# Patient Record
Sex: FEMALE | Race: BLACK OR AFRICAN AMERICAN | Employment: FULL TIME | URBAN - METROPOLITAN AREA
[De-identification: names, ages, dates, MRNs, and addresses within clinical notes are randomized per-mention and may not be internally consistent; named-entity substitution may affect disease eponyms.]

---

## 2019-02-27 ENCOUNTER — HOSPITAL ENCOUNTER (EMERGENCY)
Facility: HOSPITAL | Age: 19
Discharge: HOME | End: 2019-02-27
Attending: EMERGENCY MEDICINE
Payer: MEDICAID

## 2019-02-27 VITALS
WEIGHT: 125 LBS | TEMPERATURE: 99 F | HEART RATE: 90 BPM | OXYGEN SATURATION: 97 % | BODY MASS INDEX: 23 KG/M2 | RESPIRATION RATE: 18 BRPM | HEIGHT: 62 IN | SYSTOLIC BLOOD PRESSURE: 142 MMHG | DIASTOLIC BLOOD PRESSURE: 66 MMHG

## 2019-02-27 DIAGNOSIS — O20.0 THREATENED ABORTION: ICD-10-CM

## 2019-02-27 DIAGNOSIS — O20.9 VAGINAL BLEEDING AFFECTING EARLY PREGNANCY: Primary | ICD-10-CM

## 2019-02-27 LAB — RH BLOOD TYPE: POSITIVE

## 2019-02-27 PROCEDURE — 86901 BLOOD TYPING SEROLOGIC RH(D): CPT | Performed by: EMERGENCY MEDICINE

## 2019-02-27 PROCEDURE — 99284 EMERGENCY DEPT VISIT MOD MDM: CPT

## 2019-02-27 PROCEDURE — 76705 ECHO EXAM OF ABDOMEN: CPT

## 2019-02-27 PROCEDURE — 86900 BLOOD TYPING SEROLOGIC ABO: CPT | Performed by: EMERGENCY MEDICINE

## 2019-02-28 NOTE — ED PROVIDER NOTES
Patient Seen in: [facility] AND [facility] Emergency Department    History   Patient presents with:  Pregnancy Issues (gynecologic)      HPI    Patient presents to the ED complaining of mild vaginal bleeding starting this afternoon.   Bleeding has waxed and waned distension. There is no tenderness. Musculoskeletal: She exhibits no edema or deformity. Neurological: She is alert and oriented to person, place, and time. Skin: Skin is warm and dry. Psychiatric: She has a normal mood and affect.  Her behavior is department: Stable  .     Disposition and Plan     Clinical Impression:  Vaginal bleeding affecting early pregnancy  (primary encounter diagnosis)  Threatened     Disposition:  Discharge    Follow-up:  Your OB/GYN    Schedule an appointment as soon

## 2019-04-05 ENCOUNTER — OFFICE VISIT (OUTPATIENT)
Dept: INTERNAL MEDICINE CLINIC | Facility: CLINIC | Age: 19
End: 2019-04-05
Payer: MEDICAID

## 2019-04-05 VITALS
HEIGHT: 62 IN | TEMPERATURE: 98 F | BODY MASS INDEX: 22.82 KG/M2 | SYSTOLIC BLOOD PRESSURE: 107 MMHG | HEART RATE: 81 BPM | DIASTOLIC BLOOD PRESSURE: 66 MMHG | WEIGHT: 124 LBS

## 2019-04-05 DIAGNOSIS — Z3A.16 16 WEEKS GESTATION OF PREGNANCY: ICD-10-CM

## 2019-04-05 DIAGNOSIS — Z00.00 PHYSICAL EXAM: Primary | ICD-10-CM

## 2019-04-05 PROCEDURE — 99385 PREV VISIT NEW AGE 18-39: CPT | Performed by: INTERNAL MEDICINE

## 2019-04-06 NOTE — PROGRESS NOTES
HPI:    Patient ID: Lynette Jo is a 20year old female.     HPI    Here for physical exam  New paitent  Switching OB  Need referral for [facility] 16 mo pregnant  Was nauseated  Feeling better    /66 (BP Location: Right arm, Patient Position: Si Smokeless tobacco: Never Used    Alcohol use: No      Frequency: Never    Drug use: No       PHYSICAL EXAM:    Physical Exam   Constitutional: She appears well-nourished. No distress. HENT:   Head: Normocephalic and atraumatic.    Right Ear: External ear

## 2019-04-07 ENCOUNTER — HOSPITAL ENCOUNTER (EMERGENCY)
Facility: HOSPITAL | Age: 19
Discharge: HOME | End: 2019-04-07
Attending: EMERGENCY MEDICINE
Payer: MEDICAID

## 2019-04-07 ENCOUNTER — APPOINTMENT (OUTPATIENT)
Dept: ULTRASOUND IMAGING | Facility: HOSPITAL | Age: 19
End: 2019-04-07
Attending: EMERGENCY MEDICINE
Payer: MEDICAID

## 2019-04-07 VITALS
TEMPERATURE: 98 F | BODY MASS INDEX: 23 KG/M2 | SYSTOLIC BLOOD PRESSURE: 123 MMHG | DIASTOLIC BLOOD PRESSURE: 62 MMHG | OXYGEN SATURATION: 97 % | HEIGHT: 62 IN | RESPIRATION RATE: 18 BRPM | WEIGHT: 125 LBS | HEART RATE: 80 BPM

## 2019-04-07 DIAGNOSIS — O46.92 VAGINAL BLEEDING IN PREGNANCY, SECOND TRIMESTER: Primary | ICD-10-CM

## 2019-04-07 PROCEDURE — 84702 CHORIONIC GONADOTROPIN TEST: CPT | Performed by: EMERGENCY MEDICINE

## 2019-04-07 PROCEDURE — 80048 BASIC METABOLIC PNL TOTAL CA: CPT | Performed by: EMERGENCY MEDICINE

## 2019-04-07 PROCEDURE — 85025 COMPLETE CBC W/AUTO DIFF WBC: CPT | Performed by: EMERGENCY MEDICINE

## 2019-04-07 PROCEDURE — 93975 VASCULAR STUDY: CPT | Performed by: EMERGENCY MEDICINE

## 2019-04-07 PROCEDURE — 76815 OB US LIMITED FETUS(S): CPT | Performed by: EMERGENCY MEDICINE

## 2019-04-07 PROCEDURE — 99284 EMERGENCY DEPT VISIT MOD MDM: CPT

## 2019-04-07 PROCEDURE — 86901 BLOOD TYPING SEROLOGIC RH(D): CPT | Performed by: EMERGENCY MEDICINE

## 2019-04-07 PROCEDURE — 86900 BLOOD TYPING SEROLOGIC ABO: CPT | Performed by: EMERGENCY MEDICINE

## 2019-04-07 PROCEDURE — 81025 URINE PREGNANCY TEST: CPT

## 2019-04-07 PROCEDURE — 96374 THER/PROPH/DIAG INJ IV PUSH: CPT

## 2019-04-07 PROCEDURE — 81001 URINALYSIS AUTO W/SCOPE: CPT | Performed by: EMERGENCY MEDICINE

## 2019-04-07 RX ORDER — ONDANSETRON 2 MG/ML
4 INJECTION INTRAMUSCULAR; INTRAVENOUS ONCE
Status: COMPLETED | OUTPATIENT
Start: 2019-04-07 | End: 2019-04-07

## 2019-04-07 NOTE — ED INITIAL ASSESSMENT (HPI)
Pt is G1 16 weeks pregnant who experience spotting/slight vaginal bleeding x 2 days + cramping to RLQ

## 2019-04-07 NOTE — ED PROVIDER NOTES
Patient Seen in: [facility] AND [facility] Emergency Department    History   Patient presents with:  Pregnancy Issues (gynecologic)    Stated Complaint: Bleeding    HPI    21 yo  who is 16 weeks pregnant presents with lower abdomen crampy abdominal pain and Neurological: She is alert and oriented to person, place, and time. No cranial nerve deficit. Skin: Skin is warm and dry. Psychiatric: She has a normal mood and affect. Her behavior is normal.   Nursing note and vitals reviewed.            ED Course needed        Medications Prescribed:  Current Discharge Medication List

## 2019-04-12 ENCOUNTER — TELEPHONE (OUTPATIENT)
Dept: OBGYN CLINIC | Facility: CLINIC | Age: 19
End: 2019-04-12

## 2019-04-12 ENCOUNTER — OFFICE VISIT (OUTPATIENT)
Dept: OBGYN CLINIC | Facility: CLINIC | Age: 19
End: 2019-04-12

## 2019-04-12 VITALS
BODY MASS INDEX: 23.55 KG/M2 | DIASTOLIC BLOOD PRESSURE: 79 MMHG | WEIGHT: 128 LBS | HEIGHT: 62 IN | SYSTOLIC BLOOD PRESSURE: 134 MMHG | HEART RATE: 102 BPM

## 2019-04-12 DIAGNOSIS — O46.90 VAGINAL BLEEDING IN PREGNANCY: Primary | ICD-10-CM

## 2019-04-12 PROCEDURE — 99212 OFFICE O/P EST SF 10 MIN: CPT | Performed by: OBSTETRICS & GYNECOLOGY

## 2019-04-12 NOTE — TELEPHONE ENCOUNTER
PT NOTIFIED WE DID RECEIVE HER RECORDS AND CAN SCHEDULE AN OBN APPT SINCE SHE IS 17 WEEKS. APPT MADE. RECORDS ABOVE DESK IN FRONT OFFICE.

## 2019-04-12 NOTE — PROGRESS NOTES
James TOBIN 3/6/2000       Patient presents with: Follow - Up: ER F/U NEW PATIENT. Bandar Abbott VAGINAL BLEEDING   pt had prenatal care at [facility] and was seeing the midwives. She started care in January and has had 4 visits.   She does not have her pren

## 2019-04-17 ENCOUNTER — NURSE ONLY (OUTPATIENT)
Dept: OBGYN CLINIC | Facility: CLINIC | Age: 19
End: 2019-04-17
Payer: MEDICAID

## 2019-04-17 VITALS — BODY MASS INDEX: 23 KG/M2 | WEIGHT: 123.25 LBS

## 2019-04-17 DIAGNOSIS — Z34.90 PREGNANCY, UNSPECIFIED GESTATIONAL AGE: Primary | ICD-10-CM

## 2019-04-17 PROCEDURE — 99211 OFF/OP EST MAY X REQ PHY/QHP: CPT | Performed by: OBSTETRICS & GYNECOLOGY

## 2019-04-17 NOTE — PROGRESS NOTES
Pt seen for OBN appt today with no complaints. Transfer of care from [facility]. States was having vomiting every other day that has improved in the last two weeks. States has been able to tolerate fluids. Denies symptoms now.   Hep C la No    Hemophilia No    [place]'s Chorea No    Mental Retardation/Autism No    Muscular Dystrophy No    Neural tube defects No    Sickle Cell Disease or trait No    Kevin-Sachs Disease No    Thalassemia No    Other inherited genetic or chromosomal disorder

## 2019-04-18 ENCOUNTER — TELEPHONE (OUTPATIENT)
Dept: OBGYN CLINIC | Facility: CLINIC | Age: 19
End: 2019-04-18

## 2019-04-18 NOTE — TELEPHONE ENCOUNTER
Pt was wondering if she can come tomorrow to have the lab done. Pt informed that she does not need an appt and can come whenever is convenient for her. Pt wondering which hepatitis test it is for.   Pt informed it is Hep C and her order is in the computer

## 2019-04-19 ENCOUNTER — APPOINTMENT (OUTPATIENT)
Dept: LAB | Facility: HOSPITAL | Age: 19
End: 2019-04-19
Attending: OBSTETRICS & GYNECOLOGY
Payer: MEDICAID

## 2019-04-19 DIAGNOSIS — Z34.90 PREGNANCY, UNSPECIFIED GESTATIONAL AGE: ICD-10-CM

## 2019-04-19 PROCEDURE — 36415 COLL VENOUS BLD VENIPUNCTURE: CPT

## 2019-04-19 PROCEDURE — 86803 HEPATITIS C AB TEST: CPT

## 2019-04-22 ENCOUNTER — INITIAL PRENATAL (OUTPATIENT)
Dept: OBGYN CLINIC | Facility: CLINIC | Age: 19
End: 2019-04-22
Payer: MEDICAID

## 2019-04-22 VITALS
WEIGHT: 125 LBS | BODY MASS INDEX: 23 KG/M2 | SYSTOLIC BLOOD PRESSURE: 133 MMHG | DIASTOLIC BLOOD PRESSURE: 81 MMHG | HEART RATE: 117 BPM

## 2019-04-22 DIAGNOSIS — Z34.91 ENCOUNTER FOR SUPERVISION OF NORMAL PREGNANCY IN FIRST TRIMESTER, UNSPECIFIED GRAVIDITY: Primary | ICD-10-CM

## 2019-04-22 PROBLEM — Z33.1 PREGNANT STATE, INCIDENTAL: Status: ACTIVE | Noted: 2019-04-22

## 2019-04-22 PROBLEM — Z33.1 PREGNANT STATE, INCIDENTAL (HCC): Status: ACTIVE | Noted: 2019-04-22

## 2019-04-22 PROCEDURE — 0500F INITIAL PRENATAL CARE VISIT: CPT | Performed by: OBSTETRICS & GYNECOLOGY

## 2019-04-29 ENCOUNTER — TELEPHONE (OUTPATIENT)
Dept: OBGYN CLINIC | Facility: CLINIC | Age: 19
End: 2019-04-29

## 2019-05-03 ENCOUNTER — HOSPITAL ENCOUNTER (OUTPATIENT)
Dept: ULTRASOUND IMAGING | Facility: HOSPITAL | Age: 19
Discharge: HOME | End: 2019-05-03
Attending: OBSTETRICS & GYNECOLOGY
Payer: MEDICAID

## 2019-05-03 DIAGNOSIS — Z34.91 ENCOUNTER FOR SUPERVISION OF NORMAL PREGNANCY IN FIRST TRIMESTER, UNSPECIFIED GRAVIDITY: ICD-10-CM

## 2019-05-03 PROCEDURE — 76805 OB US >/= 14 WKS SNGL FETUS: CPT | Performed by: OBSTETRICS & GYNECOLOGY

## 2019-05-22 ENCOUNTER — ROUTINE PRENATAL (OUTPATIENT)
Dept: OBGYN CLINIC | Facility: CLINIC | Age: 19
End: 2019-05-22

## 2019-05-22 VITALS
DIASTOLIC BLOOD PRESSURE: 70 MMHG | WEIGHT: 129 LBS | HEART RATE: 87 BPM | BODY MASS INDEX: 24 KG/M2 | SYSTOLIC BLOOD PRESSURE: 119 MMHG

## 2019-05-22 DIAGNOSIS — Z34.02 ENCOUNTER FOR SUPERVISION OF NORMAL FIRST PREGNANCY IN SECOND TRIMESTER: Primary | ICD-10-CM

## 2019-05-22 PROCEDURE — 99212 OFFICE O/P EST SF 10 MIN: CPT | Performed by: OBSTETRICS & GYNECOLOGY

## 2019-05-22 PROCEDURE — 81002 URINALYSIS NONAUTO W/O SCOPE: CPT | Performed by: OBSTETRICS & GYNECOLOGY

## 2019-06-26 ENCOUNTER — APPOINTMENT (OUTPATIENT)
Dept: LAB | Facility: HOSPITAL | Age: 19
End: 2019-06-26
Attending: OBSTETRICS & GYNECOLOGY
Payer: MEDICAID

## 2019-06-26 DIAGNOSIS — Z34.02 ENCOUNTER FOR SUPERVISION OF NORMAL FIRST PREGNANCY IN SECOND TRIMESTER: ICD-10-CM

## 2019-06-26 PROCEDURE — 82950 GLUCOSE TEST: CPT

## 2019-06-26 PROCEDURE — 36415 COLL VENOUS BLD VENIPUNCTURE: CPT

## 2019-06-26 PROCEDURE — 85027 COMPLETE CBC AUTOMATED: CPT

## 2019-07-03 ENCOUNTER — ROUTINE PRENATAL (OUTPATIENT)
Dept: OBGYN CLINIC | Facility: CLINIC | Age: 19
End: 2019-07-03
Payer: MEDICAID

## 2019-07-03 VITALS
DIASTOLIC BLOOD PRESSURE: 86 MMHG | WEIGHT: 132.81 LBS | SYSTOLIC BLOOD PRESSURE: 130 MMHG | BODY MASS INDEX: 24 KG/M2 | HEART RATE: 82 BPM

## 2019-07-03 DIAGNOSIS — Z34.93 ENCOUNTER FOR SUPERVISION OF NORMAL PREGNANCY IN THIRD TRIMESTER, UNSPECIFIED GRAVIDITY: Primary | ICD-10-CM

## 2019-07-03 LAB
MULTISTIX LOT#: NORMAL NUMERIC
PH, URINE: 6.5 (ref 4.5–8)
SPECIFIC GRAVITY: 1.01 (ref 1–1.03)
UROBILINOGEN,SEMI-QN: 0 MG/DL (ref 0–1.9)

## 2019-07-03 PROCEDURE — 0502F SUBSEQUENT PRENATAL CARE: CPT | Performed by: OBSTETRICS & GYNECOLOGY

## 2019-07-03 PROCEDURE — 81002 URINALYSIS NONAUTO W/O SCOPE: CPT | Performed by: OBSTETRICS & GYNECOLOGY

## 2019-07-03 NOTE — PROGRESS NOTES
Had IC last night and had an episode of PCB. Light pink spotting. NO cramping, lof. +FM. No bleeding since. Spec exam and no active bleeding with scant brown discharge. cx closed/long. PTL labor precautions given. Needs iron for anemia. RTC 2 wks.

## 2019-07-17 ENCOUNTER — ROUTINE PRENATAL (OUTPATIENT)
Dept: OBGYN CLINIC | Facility: CLINIC | Age: 19
End: 2019-07-17
Payer: MEDICAID

## 2019-07-17 VITALS
HEART RATE: 76 BPM | DIASTOLIC BLOOD PRESSURE: 74 MMHG | WEIGHT: 131.38 LBS | BODY MASS INDEX: 24 KG/M2 | SYSTOLIC BLOOD PRESSURE: 120 MMHG

## 2019-07-17 DIAGNOSIS — Z34.93 ENCOUNTER FOR SUPERVISION OF NORMAL PREGNANCY IN THIRD TRIMESTER, UNSPECIFIED GRAVIDITY: Primary | ICD-10-CM

## 2019-07-17 LAB
MULTISTIX LOT#: ABNORMAL NUMERIC
PH, URINE: 7 (ref 4.5–8)
SPECIFIC GRAVITY: 1 (ref 1–1.03)
UROBILINOGEN,SEMI-QN: 0 MG/DL (ref 0–1.9)

## 2019-07-17 PROCEDURE — 0502F SUBSEQUENT PRENATAL CARE: CPT | Performed by: OBSTETRICS & GYNECOLOGY

## 2019-07-17 PROCEDURE — 81002 URINALYSIS NONAUTO W/O SCOPE: CPT | Performed by: OBSTETRICS & GYNECOLOGY

## 2019-07-31 ENCOUNTER — ROUTINE PRENATAL (OUTPATIENT)
Dept: OBGYN CLINIC | Facility: CLINIC | Age: 19
End: 2019-07-31
Payer: MEDICAID

## 2019-07-31 ENCOUNTER — TELEPHONE (OUTPATIENT)
Dept: OBGYN CLINIC | Facility: CLINIC | Age: 19
End: 2019-07-31

## 2019-07-31 VITALS
BODY MASS INDEX: 25 KG/M2 | WEIGHT: 134 LBS | DIASTOLIC BLOOD PRESSURE: 79 MMHG | HEART RATE: 96 BPM | SYSTOLIC BLOOD PRESSURE: 133 MMHG

## 2019-07-31 DIAGNOSIS — Z34.03 ENCOUNTER FOR SUPERVISION OF NORMAL FIRST PREGNANCY IN THIRD TRIMESTER: Primary | ICD-10-CM

## 2019-07-31 LAB
APPEARANCE: CLEAR
MULTISTIX LOT#: NORMAL NUMERIC

## 2019-07-31 PROCEDURE — 0502F SUBSEQUENT PRENATAL CARE: CPT | Performed by: OBSTETRICS & GYNECOLOGY

## 2019-07-31 PROCEDURE — 81002 URINALYSIS NONAUTO W/O SCOPE: CPT | Performed by: OBSTETRICS & GYNECOLOGY

## 2019-07-31 NOTE — TELEPHONE ENCOUNTER
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 Logged for processing.  NK

## 2019-08-12 ENCOUNTER — LAB ENCOUNTER (OUTPATIENT)
Dept: LAB | Facility: HOSPITAL | Age: 19
End: 2019-08-12
Attending: OBSTETRICS & GYNECOLOGY
Payer: MEDICAID

## 2019-08-12 DIAGNOSIS — Z34.03 ENCOUNTER FOR SUPERVISION OF NORMAL FIRST PREGNANCY IN THIRD TRIMESTER: ICD-10-CM

## 2019-08-12 LAB
DEPRECATED RDW RBC AUTO: 43.7 FL (ref 35.1–46.3)
ERYTHROCYTE [DISTWIDTH] IN BLOOD BY AUTOMATED COUNT: 12.8 % (ref 11–15)
HCT VFR BLD AUTO: 33.8 % (ref 35–48)
HGB BLD-MCNC: 11.2 G/DL (ref 12–16)
MCH RBC QN AUTO: 30.9 PG (ref 26–34)
MCHC RBC AUTO-ENTMCNC: 33.1 G/DL (ref 31–37)
MCV RBC AUTO: 93.1 FL (ref 80–100)
PLATELET # BLD AUTO: 216 10(3)UL (ref 150–450)
RBC # BLD AUTO: 3.63 X10(6)UL (ref 3.8–5.3)
WBC # BLD AUTO: 7.1 X10(3) UL (ref 4–11)

## 2019-08-12 PROCEDURE — 36415 COLL VENOUS BLD VENIPUNCTURE: CPT

## 2019-08-12 PROCEDURE — 85027 COMPLETE CBC AUTOMATED: CPT

## 2019-08-12 PROCEDURE — 86780 TREPONEMA PALLIDUM: CPT

## 2019-08-12 PROCEDURE — 87389 HIV-1 AG W/HIV-1&-2 AB AG IA: CPT

## 2019-08-14 ENCOUNTER — ROUTINE PRENATAL (OUTPATIENT)
Dept: OBGYN CLINIC | Facility: CLINIC | Age: 19
End: 2019-08-14
Payer: MEDICAID

## 2019-08-14 VITALS
HEART RATE: 79 BPM | WEIGHT: 136.19 LBS | BODY MASS INDEX: 25 KG/M2 | DIASTOLIC BLOOD PRESSURE: 82 MMHG | SYSTOLIC BLOOD PRESSURE: 130 MMHG

## 2019-08-14 DIAGNOSIS — Z34.93 ENCOUNTER FOR SUPERVISION OF NORMAL PREGNANCY IN THIRD TRIMESTER, UNSPECIFIED GRAVIDITY: Primary | ICD-10-CM

## 2019-08-14 LAB
APPEARANCE: CLEAR
MULTISTIX LOT#: ABNORMAL NUMERIC
PH, URINE: 7 (ref 4.5–8)
SPECIFIC GRAVITY: 1 (ref 1–1.03)
T PALLIDUM AB SER QL: NEGATIVE
URINE-COLOR: YELLOW
UROBILINOGEN,SEMI-QN: 0 MG/DL (ref 0–1.9)

## 2019-08-14 PROCEDURE — 81002 URINALYSIS NONAUTO W/O SCOPE: CPT | Performed by: OBSTETRICS & GYNECOLOGY

## 2019-08-14 PROCEDURE — 0502F SUBSEQUENT PRENATAL CARE: CPT | Performed by: OBSTETRICS & GYNECOLOGY

## 2019-08-15 NOTE — TELEPHONE ENCOUNTER
Dr. Surekha Hines,    Please sign off on form: FMLA maternity leave    -Highlight the patient and hit \"Chart\" button. -In Chart Review, w/in the Encounter tab - click 1 time on the Telephone call encounter for 7/31/19. Scroll down the telephone encounter.  -Click \"scan on\" blue Hyperlink under \"Media\" heading for DANG Hines 8/15/19 w/in the telephone enc.  -Click on Acknowledge button at the bottom right corner and left-click onto image, signature stamp appears and drag signature to Provider signature line. Stamp will turn blue. Close window.     Thank you,    Melinda Ríos

## 2019-08-27 ENCOUNTER — HOSPITAL ENCOUNTER (OUTPATIENT)
Facility: HOSPITAL | Age: 19
Setting detail: OBSERVATION
Discharge: HOME | End: 2019-08-27
Attending: OBSTETRICS & GYNECOLOGY | Admitting: OBSTETRICS & GYNECOLOGY
Payer: MEDICAID

## 2019-08-27 ENCOUNTER — ROUTINE PRENATAL (OUTPATIENT)
Dept: OBGYN CLINIC | Facility: CLINIC | Age: 19
End: 2019-08-27
Payer: MEDICAID

## 2019-08-27 ENCOUNTER — TELEPHONE (OUTPATIENT)
Dept: OBGYN CLINIC | Facility: CLINIC | Age: 19
End: 2019-08-27

## 2019-08-27 VITALS
BODY MASS INDEX: 25 KG/M2 | WEIGHT: 137.38 LBS | SYSTOLIC BLOOD PRESSURE: 150 MMHG | HEART RATE: 82 BPM | DIASTOLIC BLOOD PRESSURE: 88 MMHG

## 2019-08-27 VITALS — SYSTOLIC BLOOD PRESSURE: 121 MMHG | HEART RATE: 71 BPM | DIASTOLIC BLOOD PRESSURE: 74 MMHG

## 2019-08-27 DIAGNOSIS — Z34.93 ENCOUNTER FOR SUPERVISION OF NORMAL PREGNANCY IN THIRD TRIMESTER, UNSPECIFIED GRAVIDITY: Primary | ICD-10-CM

## 2019-08-27 PROBLEM — Z34.90 PREGNANCY: Status: ACTIVE | Noted: 2019-08-27

## 2019-08-27 PROBLEM — Z34.90 PREGNANCY (HCC): Status: ACTIVE | Noted: 2019-08-27

## 2019-08-27 LAB
ALBUMIN SERPL-MCNC: 2.8 G/DL (ref 3.4–5)
ALBUMIN/GLOB SERPL: 0.7 {RATIO} (ref 1–2)
ALP LIVER SERPL-CCNC: 167 U/L (ref 52–144)
ALT SERPL-CCNC: 8 U/L (ref 13–56)
ANION GAP SERPL CALC-SCNC: 8 MMOL/L (ref 0–18)
AST SERPL-CCNC: 13 U/L (ref 15–37)
BASOPHILS # BLD AUTO: 0.03 X10(3) UL (ref 0–0.2)
BASOPHILS NFR BLD AUTO: 0.4 %
BILIRUB SERPL-MCNC: 0.3 MG/DL (ref 0.1–2)
BUN BLD-MCNC: 5 MG/DL (ref 7–18)
BUN/CREAT SERPL: 7.5 (ref 10–20)
CALCIUM BLD-MCNC: 8.2 MG/DL (ref 8.5–10.1)
CHLORIDE SERPL-SCNC: 108 MMOL/L (ref 98–112)
CO2 SERPL-SCNC: 25 MMOL/L (ref 21–32)
CREAT BLD-MCNC: 0.67 MG/DL (ref 0.55–1.02)
CREAT UR-SCNC: 78.6 MG/DL
DEPRECATED RDW RBC AUTO: 43.8 FL (ref 35.1–46.3)
EOSINOPHIL # BLD AUTO: 0.04 X10(3) UL (ref 0–0.7)
EOSINOPHIL NFR BLD AUTO: 0.5 %
ERYTHROCYTE [DISTWIDTH] IN BLOOD BY AUTOMATED COUNT: 12.7 % (ref 11–15)
GLOBULIN PLAS-MCNC: 4 G/DL (ref 2.8–4.4)
GLUCOSE BLD-MCNC: 88 MG/DL (ref 70–99)
HCT VFR BLD AUTO: 34.1 % (ref 35–48)
HGB BLD-MCNC: 11.3 G/DL (ref 12–16)
IMM GRANULOCYTES # BLD AUTO: 0.03 X10(3) UL (ref 0–1)
IMM GRANULOCYTES NFR BLD: 0.4 %
LYMPHOCYTES # BLD AUTO: 2.21 X10(3) UL (ref 1–4)
LYMPHOCYTES NFR BLD AUTO: 29.4 %
M PROTEIN MFR SERPL ELPH: 6.8 G/DL (ref 6.4–8.2)
MCH RBC QN AUTO: 31 PG (ref 26–34)
MCHC RBC AUTO-ENTMCNC: 33.1 G/DL (ref 31–37)
MCV RBC AUTO: 93.7 FL (ref 80–100)
MONOCYTES # BLD AUTO: 0.39 X10(3) UL (ref 0.1–1)
MONOCYTES NFR BLD AUTO: 5.2 %
MULTISTIX LOT#: NORMAL NUMERIC
NEUTROPHILS # BLD AUTO: 4.81 X10 (3) UL (ref 1.5–7.7)
NEUTROPHILS # BLD AUTO: 4.81 X10(3) UL (ref 1.5–7.7)
NEUTROPHILS NFR BLD AUTO: 64.1 %
OSMOLALITY SERPL CALC.SUM OF ELEC: 289 MOSM/KG (ref 275–295)
PATIENT FASTING: NO
PH, URINE: 6 (ref 4.5–8)
PLATELET # BLD AUTO: 208 10(3)UL (ref 150–450)
POTASSIUM SERPL-SCNC: 3.4 MMOL/L (ref 3.5–5.1)
PROT UR-MCNC: 14.6 MG/DL
PROT/CREAT UR-RTO: 0.19
RBC # BLD AUTO: 3.64 X10(6)UL (ref 3.8–5.3)
SODIUM SERPL-SCNC: 141 MMOL/L (ref 136–145)
SPECIFIC GRAVITY: 1 (ref 1–1.03)
UROBILINOGEN,SEMI-QN: 0 MG/DL (ref 0–1.9)
WBC # BLD AUTO: 7.5 X10(3) UL (ref 4–11)

## 2019-08-27 PROCEDURE — 59025 FETAL NON-STRESS TEST: CPT | Performed by: OBSTETRICS & GYNECOLOGY

## 2019-08-27 PROCEDURE — 81002 URINALYSIS NONAUTO W/O SCOPE: CPT | Performed by: OBSTETRICS & GYNECOLOGY

## 2019-08-27 PROCEDURE — 0502F SUBSEQUENT PRENATAL CARE: CPT | Performed by: OBSTETRICS & GYNECOLOGY

## 2019-08-27 NOTE — PROGRESS NOTES
Cramping and mucousy bloody dc overnight. Discussed this is mucous plug. BP elevated x 2 and sent to [facility] for PreE work up. RTC 1 wk.

## 2019-08-27 NOTE — TELEPHONE ENCOUNTER
C/O LOW ABDOMINAL CRAMPING SINCE 1AM, VERY IRREGULAR. HAD SOME SPOTTING AND SOME \"GOUPY\" DISCHARGE WITH BLOOD IN IT THIS MORNING. DENIES ANY TIGHTENING OF HER WHOLE BELLY. STATES BABY IS MOVING NORMALLY AND DENIES ANY LEAKING.   PT ASKED TO BE SEEN TOD

## 2019-08-28 ENCOUNTER — TELEPHONE (OUTPATIENT)
Dept: OBGYN CLINIC | Facility: CLINIC | Age: 19
End: 2019-08-28

## 2019-08-28 NOTE — TELEPHONE ENCOUNTER
PT STATES SOMEONE MENTIONED SOMETHING ABOUT HER KIDNEY AND SHE DOESN'T UNDERSTAND WHAT THAT IS ABOUT. SHE FEELS THERE ARE JUST AS MANY RISKS BEING INDUCED EARLY.   ADVISED WILL SEND MESSAGE TO JLK (ON CALL) TO SPEAK WITH PT OR VICKIK MAY NEED TO CONFER WITH C

## 2019-08-28 NOTE — TRIAGE
[facility]      Triage Note    Edman Po Patient Status:  Observation    3/6/2000 MRN [id number]   Location [address]  Attending Corie Iyer MD   Hosp Day # 0 PCP MD Millie Garg Interpretation: Reactive           Nonstress Test Second Interpretation: Reactive          FHR Category: Category I           Additional Comments Comments: pt sent from office d/t elevated BP.  rec. orders for nst, labs and BP monitoring. NST reactive.  Ezekiel Bacon

## 2019-08-28 NOTE — TELEPHONE ENCOUNTER
Pt sent to [facility] for elevated BP's in the office 144/91 and 150/88 and labs were normal and pc/ ratio was 0.19. In triage initial BP was 149/97 but other BP's were 120-130/70-80.   Consulted MFM and recs were to deliver before 38 weeks but it was not urgent

## 2019-08-29 ENCOUNTER — HOSPITAL ENCOUNTER (INPATIENT)
Facility: HOSPITAL | Age: 19
LOS: 3 days | Discharge: HOME | End: 2019-09-01
Attending: OBSTETRICS & GYNECOLOGY | Admitting: OBSTETRICS & GYNECOLOGY
Payer: MEDICAID

## 2019-08-29 ENCOUNTER — ROUTINE PRENATAL (OUTPATIENT)
Dept: OBGYN CLINIC | Facility: CLINIC | Age: 19
End: 2019-08-29
Payer: MEDICAID

## 2019-08-29 VITALS
HEART RATE: 76 BPM | BODY MASS INDEX: 25 KG/M2 | SYSTOLIC BLOOD PRESSURE: 140 MMHG | DIASTOLIC BLOOD PRESSURE: 92 MMHG | WEIGHT: 139 LBS

## 2019-08-29 DIAGNOSIS — Z34.93 ENCOUNTER FOR SUPERVISION OF NORMAL PREGNANCY IN THIRD TRIMESTER, UNSPECIFIED GRAVIDITY: Primary | ICD-10-CM

## 2019-08-29 PROBLEM — O13.3 GESTATIONAL HYPERTENSION, THIRD TRIMESTER (HCC): Status: ACTIVE | Noted: 2019-08-29

## 2019-08-29 PROBLEM — O13.3 GESTATIONAL HYPERTENSION, THIRD TRIMESTER: Status: ACTIVE | Noted: 2019-08-29

## 2019-08-29 LAB
ALBUMIN SERPL-MCNC: 2.7 G/DL (ref 3.4–5)
ALBUMIN/GLOB SERPL: 0.7 {RATIO} (ref 1–2)
ALP LIVER SERPL-CCNC: 170 U/L (ref 52–144)
ALT SERPL-CCNC: 10 U/L (ref 13–56)
ANION GAP SERPL CALC-SCNC: 8 MMOL/L (ref 0–18)
ANTIBODY SCREEN: NEGATIVE
AST SERPL-CCNC: 12 U/L (ref 15–37)
BASOPHILS # BLD AUTO: 0.02 X10(3) UL (ref 0–0.2)
BASOPHILS NFR BLD AUTO: 0.3 %
BILIRUB SERPL-MCNC: 0.3 MG/DL (ref 0.1–2)
BUN BLD-MCNC: 4 MG/DL (ref 7–18)
BUN/CREAT SERPL: 6 (ref 10–20)
CALCIUM BLD-MCNC: 8.3 MG/DL (ref 8.5–10.1)
CHLORIDE SERPL-SCNC: 111 MMOL/L (ref 98–112)
CO2 SERPL-SCNC: 23 MMOL/L (ref 21–32)
CREAT BLD-MCNC: 0.67 MG/DL (ref 0.55–1.02)
CREAT UR-SCNC: 54.6 MG/DL
DEPRECATED RDW RBC AUTO: 42.9 FL (ref 35.1–46.3)
EOSINOPHIL # BLD AUTO: 0.06 X10(3) UL (ref 0–0.7)
EOSINOPHIL NFR BLD AUTO: 0.9 %
ERYTHROCYTE [DISTWIDTH] IN BLOOD BY AUTOMATED COUNT: 12.7 % (ref 11–15)
GLOBULIN PLAS-MCNC: 4.1 G/DL (ref 2.8–4.4)
GLUCOSE BLD-MCNC: 70 MG/DL (ref 70–99)
HCT VFR BLD AUTO: 34.1 % (ref 35–48)
HGB BLD-MCNC: 11.3 G/DL (ref 12–16)
IMM GRANULOCYTES # BLD AUTO: 0.03 X10(3) UL (ref 0–1)
IMM GRANULOCYTES NFR BLD: 0.4 %
LYMPHOCYTES # BLD AUTO: 2.29 X10(3) UL (ref 1–4)
LYMPHOCYTES NFR BLD AUTO: 33 %
M PROTEIN MFR SERPL ELPH: 6.8 G/DL (ref 6.4–8.2)
MCH RBC QN AUTO: 30.5 PG (ref 26–34)
MCHC RBC AUTO-ENTMCNC: 33.1 G/DL (ref 31–37)
MCV RBC AUTO: 92.2 FL (ref 80–100)
MONOCYTES # BLD AUTO: 0.57 X10(3) UL (ref 0.1–1)
MONOCYTES NFR BLD AUTO: 8.2 %
MULTISTIX LOT#: NORMAL NUMERIC
NEUTROPHILS # BLD AUTO: 3.96 X10 (3) UL (ref 1.5–7.7)
NEUTROPHILS # BLD AUTO: 3.96 X10(3) UL (ref 1.5–7.7)
NEUTROPHILS NFR BLD AUTO: 57.2 %
OSMOLALITY SERPL CALC.SUM OF ELEC: 289 MOSM/KG (ref 275–295)
PH, URINE: 7 (ref 4.5–8)
PLATELET # BLD AUTO: 208 10(3)UL (ref 150–450)
POTASSIUM SERPL-SCNC: 3.4 MMOL/L (ref 3.5–5.1)
PROT UR-MCNC: 11.8 MG/DL
PROT/CREAT UR-RTO: 0.22
RBC # BLD AUTO: 3.7 X10(6)UL (ref 3.8–5.3)
RH BLOOD TYPE: POSITIVE
SODIUM SERPL-SCNC: 142 MMOL/L (ref 136–145)
SPECIFIC GRAVITY: 1.01 (ref 1–1.03)
URINE-COLOR: YELLOW
WBC # BLD AUTO: 6.9 X10(3) UL (ref 4–11)

## 2019-08-29 PROCEDURE — 81002 URINALYSIS NONAUTO W/O SCOPE: CPT | Performed by: OBSTETRICS & GYNECOLOGY

## 2019-08-29 PROCEDURE — 0502F SUBSEQUENT PRENATAL CARE: CPT | Performed by: OBSTETRICS & GYNECOLOGY

## 2019-08-29 PROCEDURE — 99223 1ST HOSP IP/OBS HIGH 75: CPT | Performed by: OBSTETRICS & GYNECOLOGY

## 2019-08-29 PROCEDURE — 3E033VJ INTRODUCTION OF OTHER HORMONE INTO PERIPHERAL VEIN, PERCUTANEOUS APPROACH: ICD-10-PCS | Performed by: OBSTETRICS & GYNECOLOGY

## 2019-08-29 RX ORDER — HYDROXYZINE HYDROCHLORIDE 50 MG/ML
50 INJECTION, SOLUTION INTRAMUSCULAR EVERY 4 HOURS PRN
Status: DISCONTINUED | OUTPATIENT
Start: 2019-08-29 | End: 2019-08-30

## 2019-08-29 RX ORDER — NALBUPHINE HCL 10 MG/ML
10 AMPUL (ML) INJECTION
Status: DISCONTINUED | OUTPATIENT
Start: 2019-08-29 | End: 2019-08-30

## 2019-08-29 RX ORDER — DEXTROSE, SODIUM CHLORIDE, SODIUM LACTATE, POTASSIUM CHLORIDE, AND CALCIUM CHLORIDE 5; .6; .31; .03; .02 G/100ML; G/100ML; G/100ML; G/100ML; G/100ML
INJECTION, SOLUTION INTRAVENOUS CONTINUOUS
Status: DISCONTINUED | OUTPATIENT
Start: 2019-08-29 | End: 2019-08-30

## 2019-08-29 RX ORDER — HYDRALAZINE HYDROCHLORIDE 20 MG/ML
10 INJECTION INTRAMUSCULAR; INTRAVENOUS ONCE AS NEEDED
Status: DISCONTINUED | OUTPATIENT
Start: 2019-08-29 | End: 2019-08-30

## 2019-08-29 RX ORDER — SODIUM CHLORIDE 0.9 % (FLUSH) 0.9 %
10 SYRINGE (ML) INJECTION AS NEEDED
Status: DISCONTINUED | OUTPATIENT
Start: 2019-08-29 | End: 2019-08-30

## 2019-08-29 RX ORDER — LABETALOL HYDROCHLORIDE 5 MG/ML
20 INJECTION, SOLUTION INTRAVENOUS ONCE AS NEEDED
Status: DISCONTINUED | OUTPATIENT
Start: 2019-08-29 | End: 2019-08-30

## 2019-08-29 RX ORDER — LIDOCAINE HYDROCHLORIDE 10 MG/ML
30 INJECTION, SOLUTION EPIDURAL; INFILTRATION; INTRACAUDAL; PERINEURAL ONCE
Status: DISCONTINUED | OUTPATIENT
Start: 2019-08-29 | End: 2019-08-30

## 2019-08-29 RX ORDER — ONDANSETRON 2 MG/ML
4 INJECTION INTRAMUSCULAR; INTRAVENOUS EVERY 6 HOURS PRN
Status: DISCONTINUED | OUTPATIENT
Start: 2019-08-29 | End: 2019-08-30

## 2019-08-29 RX ORDER — LABETALOL HYDROCHLORIDE 5 MG/ML
80 INJECTION, SOLUTION INTRAVENOUS ONCE AS NEEDED
Status: DISCONTINUED | OUTPATIENT
Start: 2019-08-29 | End: 2019-08-30

## 2019-08-29 RX ORDER — AMMONIA INHALANTS 0.04 G/.3ML
0.3 INHALANT RESPIRATORY (INHALATION) AS NEEDED
Status: DISCONTINUED | OUTPATIENT
Start: 2019-08-29 | End: 2019-08-30

## 2019-08-29 RX ORDER — LABETALOL HYDROCHLORIDE 5 MG/ML
40 INJECTION, SOLUTION INTRAVENOUS ONCE AS NEEDED
Status: DISCONTINUED | OUTPATIENT
Start: 2019-08-29 | End: 2019-08-30

## 2019-08-29 RX ORDER — TERBUTALINE SULFATE 1 MG/ML
0.25 INJECTION, SOLUTION SUBCUTANEOUS AS NEEDED
Status: DISCONTINUED | OUTPATIENT
Start: 2019-08-29 | End: 2019-08-30

## 2019-08-29 RX ORDER — TRISODIUM CITRATE DIHYDRATE AND CITRIC ACID MONOHYDRATE 500; 334 MG/5ML; MG/5ML
30 SOLUTION ORAL AS NEEDED
Status: DISCONTINUED | OUTPATIENT
Start: 2019-08-29 | End: 2019-08-30

## 2019-08-29 RX ORDER — TERBUTALINE SULFATE 1 MG/ML
0.25 INJECTION, SOLUTION SUBCUTANEOUS
Status: ACTIVE | OUTPATIENT
Start: 2019-08-29 | End: 2019-08-29

## 2019-08-29 NOTE — H&P
[address]. Patient Status:  Inpatient    3/6/2000 MRN [id number]   Location [address] [facility] Attending Lore Smith MD   Hosp Day # 0 PCP Sonia Mason MD     Date of Adm encounter of 65/44/51   COMP METABOLIC PANEL (14)   Result Value Ref Range    Glucose 70 70 - 99 mg/dL    Sodium 142 136 - 145 mmol/L    Potassium 3.4 (L) 3.5 - 5.1 mmol/L    Chloride 111 98 - 112 mmol/L    CO2 23.0 21.0 - 32.0 mmol/L    Anion Gap 8 0 - 18 Granulocyte % 0.4 %          ASSESSMENT:    1. Gestational HTN      PLAN:    1. Cytotec induction  2. GBS neg  3.  FHTs reassuring         Zack Clinton  8/29/2019  12:07 PM

## 2019-08-29 NOTE — PROGRESS NOTES
Dr Issac Womack pged  Notified of variables  And tachysystole pt denies pain  md will evluate strip and decide if cytotec should be held

## 2019-08-30 ENCOUNTER — ANESTHESIA EVENT (OUTPATIENT)
Dept: OBGYN UNIT | Facility: HOSPITAL | Age: 19
End: 2019-08-30
Payer: MEDICAID

## 2019-08-30 ENCOUNTER — ANESTHESIA (OUTPATIENT)
Dept: OBGYN UNIT | Facility: HOSPITAL | Age: 19
End: 2019-08-30
Payer: MEDICAID

## 2019-08-30 PROCEDURE — 59409 OBSTETRICAL CARE: CPT | Performed by: OBSTETRICS & GYNECOLOGY

## 2019-08-30 PROCEDURE — 0HQ9XZZ REPAIR PERINEUM SKIN, EXTERNAL APPROACH: ICD-10-PCS | Performed by: OBSTETRICS & GYNECOLOGY

## 2019-08-30 RX ORDER — DIAPER,BRIEF,INFANT-TODD,DISP
1 EACH MISCELLANEOUS EVERY 6 HOURS PRN
Status: DISCONTINUED | OUTPATIENT
Start: 2019-08-30 | End: 2019-09-01

## 2019-08-30 RX ORDER — NALBUPHINE HCL 10 MG/ML
2.5 AMPUL (ML) INJECTION
Status: DISCONTINUED | OUTPATIENT
Start: 2019-08-30 | End: 2019-09-01

## 2019-08-30 RX ORDER — MISOPROSTOL 200 UG/1
TABLET ORAL
Status: DISPENSED
Start: 2019-08-30 | End: 2019-08-30

## 2019-08-30 RX ORDER — AMMONIA INHALANTS 0.04 G/.3ML
0.3 INHALANT RESPIRATORY (INHALATION) AS NEEDED
Status: DISCONTINUED | OUTPATIENT
Start: 2019-08-30 | End: 2019-09-01

## 2019-08-30 RX ORDER — IBUPROFEN 600 MG/1
600 TABLET ORAL EVERY 6 HOURS PRN
Status: DISCONTINUED | OUTPATIENT
Start: 2019-08-30 | End: 2019-09-01

## 2019-08-30 RX ORDER — ONDANSETRON 2 MG/ML
4 INJECTION INTRAMUSCULAR; INTRAVENOUS EVERY 6 HOURS PRN
Status: DISCONTINUED | OUTPATIENT
Start: 2019-08-30 | End: 2019-09-01

## 2019-08-30 RX ORDER — BISACODYL 10 MG
10 SUPPOSITORY, RECTAL RECTAL ONCE AS NEEDED
Status: DISCONTINUED | OUTPATIENT
Start: 2019-08-30 | End: 2019-09-01

## 2019-08-30 RX ORDER — SODIUM CHLORIDE 0.9 % (FLUSH) 0.9 %
10 SYRINGE (ML) INJECTION AS NEEDED
Status: DISCONTINUED | OUTPATIENT
Start: 2019-08-30 | End: 2019-09-01

## 2019-08-30 RX ORDER — CHOLECALCIFEROL (VITAMIN D3) 25 MCG
1 TABLET,CHEWABLE ORAL DAILY
Status: DISCONTINUED | OUTPATIENT
Start: 2019-08-30 | End: 2019-09-01

## 2019-08-30 RX ORDER — BUPIVACAINE HYDROCHLORIDE 2.5 MG/ML
10 INJECTION, SOLUTION EPIDURAL; INFILTRATION; INTRACAUDAL ONCE
Status: DISCONTINUED | OUTPATIENT
Start: 2019-08-30 | End: 2019-08-30

## 2019-08-30 RX ORDER — MISOPROSTOL 200 UG/1
600 TABLET ORAL ONCE
Status: COMPLETED | OUTPATIENT
Start: 2019-08-30 | End: 2019-08-30

## 2019-08-30 RX ORDER — EPHEDRINE SULFATE/0.9% NACL/PF 25 MG/5 ML
5 SYRINGE (ML) INTRAVENOUS AS NEEDED
Status: DISCONTINUED | OUTPATIENT
Start: 2019-08-30 | End: 2019-09-01

## 2019-08-30 RX ORDER — HYDROCODONE BITARTRATE AND ACETAMINOPHEN 5; 325 MG/1; MG/1
1 TABLET ORAL EVERY 6 HOURS PRN
Status: DISCONTINUED | OUTPATIENT
Start: 2019-08-30 | End: 2019-09-01

## 2019-08-30 RX ORDER — LIDOCAINE HYDROCHLORIDE AND EPINEPHRINE 15; 5 MG/ML; UG/ML
INJECTION, SOLUTION EPIDURAL AS NEEDED
Status: DISCONTINUED | OUTPATIENT
Start: 2019-08-30 | End: 2019-08-30 | Stop reason: SURG

## 2019-08-30 RX ORDER — LIDOCAINE HYDROCHLORIDE 10 MG/ML
INJECTION, SOLUTION EPIDURAL; INFILTRATION; INTRACAUDAL; PERINEURAL AS NEEDED
Status: DISCONTINUED | OUTPATIENT
Start: 2019-08-30 | End: 2019-08-30 | Stop reason: SURG

## 2019-08-30 RX ORDER — DOCUSATE SODIUM 100 MG/1
100 CAPSULE, LIQUID FILLED ORAL 2 TIMES DAILY
Status: DISCONTINUED | OUTPATIENT
Start: 2019-08-30 | End: 2019-09-01

## 2019-08-30 RX ORDER — LIDOCAINE HYDROCHLORIDE AND EPINEPHRINE 20; 5 MG/ML; UG/ML
10 INJECTION, SOLUTION EPIDURAL; INFILTRATION; INTRACAUDAL; PERINEURAL ONCE
Status: DISCONTINUED | OUTPATIENT
Start: 2019-08-30 | End: 2019-08-30

## 2019-08-30 RX ORDER — SIMETHICONE 80 MG
80 TABLET,CHEWABLE ORAL 3 TIMES DAILY PRN
Status: DISCONTINUED | OUTPATIENT
Start: 2019-08-30 | End: 2019-09-01

## 2019-08-30 RX ADMIN — LIDOCAINE HYDROCHLORIDE 3 ML: 10 INJECTION, SOLUTION EPIDURAL; INFILTRATION; INTRACAUDAL; PERINEURAL at 04:43:00

## 2019-08-30 RX ADMIN — LIDOCAINE HYDROCHLORIDE AND EPINEPHRINE 3 ML: 15; 5 INJECTION, SOLUTION EPIDURAL at 04:51:00

## 2019-08-30 NOTE — PROGRESS NOTES
Admission Summary     Patient was received in room 351 via wheelchair. Bedside report received from Sadiq Harris RN. Patient assisted to bed with standby assist. Vitals and assessment WNL. Fundus firm, lochia small.  Bed in locked and lowest position and call l

## 2019-08-30 NOTE — LACTATION NOTE
LACTATION NOTE - MOTHER      Evaluation Type: Inpatient    Problems identified  Problems identified: Knowledge deficit    Maternal history  Maternal history: Induction of labor;PIH    Breastfeeding goal  Breastfeeding goal: To maintain breast milk feeding

## 2019-08-30 NOTE — DISCHARGE SUMMARY
[facility]    Discharge Summary    Audra Person Patient Status:  Inpatient    3/6/2000 MRN [id number]   Location [address] Attending Tatyana Ceja MD   Hosp Day # 1       Admit date:  2019    D

## 2019-08-30 NOTE — PROGRESS NOTES
RN explained need for Terbutaline, patient states she is tired of being in the hospital, doesn't understand why her baby hasn't come yet, and that she wants to remove her IV and just go home.  THis RN explained diagnosis and indication for inductions, reinf

## 2019-08-30 NOTE — L&D DELIVERY NOTE
[facility] HOSP - [facility]    Vaginal Delivery Note    Naomi Montes Patient Status:  Inpatient    3/6/2000 MRN [id number]   Location [address] [facility] Attending Landy Martins MD   Hosp Day # 1 PCP Regino Valente MD     [address] [place] [address]

## 2019-08-30 NOTE — PLAN OF CARE
Problem: Patient Centered Care  Goal: Patient preferences are identified and integrated in the patient's plan of care  Description  Interventions:  - What would you like us to know as we care for you?  Pain free  - Provide timely, complete, and accurate i interaction with staff  Outcome: Progressing     Problem: COPING  Goal: Pt/Family able to verbalize concerns and demonstrate effective coping strategies  Description  INTERVENTIONS:  - Assist patient/family to identify coping skills, available support syst

## 2019-08-30 NOTE — ANESTHESIA POSTPROCEDURE EVALUATION
Patient: Kavita Hernandez    Procedure Summary     Date:  08/30/19 Room / Location:      Anesthesia Start:  7710 Anesthesia Stop:  6989    Procedure:  LABOR ANALGESIA Diagnosis:      Scheduled Providers:   Anesthesiologist:  Dequan Quesada MD    Anesth

## 2019-08-30 NOTE — PROGRESS NOTES
Patient up to bathroom with assist x 2. Voided 400/. Patient transferred to mother/baby room 351 per wheelchair in stable condition with baby and personal belongings. Accompanied by significant other and staff. Report given to mother/baby RN.  Notified w

## 2019-08-30 NOTE — LACTATION NOTE
This note was copied from a baby's chart.   LACTATION NOTE - INFANT    Evaluation Type  Evaluation Type: Inpatient    Problems & Assessment  Problems Diagnosed or Identified: Latch difficulty  Problems: comment/detail: crying, disinterested in feeding in RR

## 2019-08-30 NOTE — PLAN OF CARE
Vitals and assessment WNL. Pain controlled with Motrin/ice packs. Voiding freely and independent with self and infant care. Fundus firm; lochia small. Reviewed paperwork and new beginnings booklet including EPDS and birth certificate.      Problem: POST listen. - Educate father/SO about benefits of breast feeding and how to manage common lactation challenges.   - Recommend avoidance of specific medications or substances incompatible with breast feeding.  - Assess and monitor for signs of nipple pain/traum

## 2019-08-30 NOTE — PROGRESS NOTES
[facility]    Labor Progress Note    Laurence Mazariegos Patient Status:  Inpatient    3/6/2000 MRN [id number]   Location [address] [facility] Attending Dinora Oconnor MD   Hosp Day # 0 PCP MD Clara Michael / late despite not feeling contractions -- resolved after SQ terb. Now tracing much improved & pt willing to try Cook's catheter -- placed w/o difficulty. Plan for iv pain meds upon request; epidural once 4 cm.  Plan for AROM in am w/ pitocen  Plan discus

## 2019-08-30 NOTE — ANESTHESIA PROCEDURE NOTES
Labor Analgesia  Performed by: Emi Longo MD  Authorized by: Emi Longo MD     Patient Location:  OB  Start Time:  8/30/2019 4:40 AM  End Time:  8/30/2019 4:45 AM  Site Identification: surface landmarks    Reason for Block: labor epidural

## 2019-08-30 NOTE — PLAN OF CARE
Problem: Patient Centered Care  Goal: Patient preferences are identified and integrated in the patient's plan of care  Description  Interventions:    - Provide timely, complete, and accurate information to patient/family  - Incorporate patient and family Pt/Family able to verbalize concerns and demonstrate effective coping strategies  Description  INTERVENTIONS:  - Assist patient/family to identify coping skills, available support systems and cultural and spiritual values  - Provide emotional support, incl

## 2019-08-30 NOTE — PAYOR COMM NOTE
--------------  ADMISSION REVIEW     Payor: Seymour Tolliver #:  [id number]  Authorization Number: [id number]      Admit date: 8/29/19  Admit time: [address]    History & Physical    Date o 20.0    Calcium, Total 8.3 (L) 8.5 - 10.1 mg/dL    Calculated Osmolality 289 275 - 295 mOsm/kg    GFR, Non- 127 >=60    GFR, -American 146 >=60    ALT 10 (L) 13 - 56 U/L    AST 12 (L) 15 - 37 U/L    Alkaline Phosphatase 170 (H) 52 - prior to delivering a live female infant Apgars 9 at one minute, 9 at five minutes weight 6# 8 oz over intact perineum in ATIF position.          MEDICATIONS ADMINISTERED IN LAST 1 DAY:  dextrose 5 % /lactated ringers infusion     Date Action Dose Route

## 2019-08-30 NOTE — PROGRESS NOTES
08/30/19 1458   Vital Signs   Temp (!) 100.8 °F (38.2 °C)   Temp src Oral   Pulse 82   Heart Rate Source Monitor   Resp 16   Respiratory Quality Normal   /77   BP Location Right arm   BP Method Automatic   Patient Position Lying     Notified Dr. Lisette Clinton

## 2019-08-30 NOTE — PROGRESS NOTES
[facility]    Initial Pharmacokinetic Consult for Aminoglycoside Dosing      Jocelyn Sorto is a 20year old female who is being treated for post partum fever . Pharmacy has been asked to dose gentamicin  by Dr. Richmond Page.     She has No

## 2019-08-30 NOTE — ANESTHESIA PREPROCEDURE EVALUATION
Anesthesia PreOp Note    HPI:     Rajat Mauro is a 20year old female who presents for preoperative consultation requested by: * No surgeons listed *    Date of Surgery: 8/30/2019    * No procedures listed *  Indication: * No pre-op diagnosis enter mL/hr at 08/30/19 0036    lidocaine HCl (PF) (XYLOCAINE) 1 % injection SOLN 30 mL 30 mL Intradermal Once Bel Quezada MD     oxyTOCIN (PITOCIN) 30 units/ 500 ml 0.9% NS premix infusion 300 mL/hr Intravenous Continuous Bel Quezada MD     Terbutaline Sulfate Frequency: Never      Drug use: No      Sexual activity: Yes        Partners: Male    Lifestyle      Physical activity:        Days per week: Not on file        Minutes per session: Not on file      Stress: Not on file    Relationships      Social con FB  Neck ROM: full  Dental - normal exam     Pulmonary - negative ROS and normal exam   Cardiovascular - normal exam  Exercise tolerance: good  (+) hypertension,     Neuro/Psych - negative ROS     GI/Hepatic/Renal - negative ROS     Endo/Other - negative R

## 2019-08-31 LAB
BASOPHILS # BLD AUTO: 0.04 X10(3) UL (ref 0–0.2)
BASOPHILS NFR BLD AUTO: 0.3 %
DEPRECATED RDW RBC AUTO: 42.8 FL (ref 35.1–46.3)
EOSINOPHIL # BLD AUTO: 0.07 X10(3) UL (ref 0–0.7)
EOSINOPHIL NFR BLD AUTO: 0.5 %
ERYTHROCYTE [DISTWIDTH] IN BLOOD BY AUTOMATED COUNT: 12.8 % (ref 11–15)
HCT VFR BLD AUTO: 26.8 % (ref 35–48)
HGB BLD-MCNC: 8.9 G/DL (ref 12–16)
IMM GRANULOCYTES # BLD AUTO: 0.1 X10(3) UL (ref 0–1)
IMM GRANULOCYTES NFR BLD: 0.7 %
LYMPHOCYTES # BLD AUTO: 2.51 X10(3) UL (ref 1–4)
LYMPHOCYTES NFR BLD AUTO: 18 %
MCH RBC QN AUTO: 30.7 PG (ref 26–34)
MCHC RBC AUTO-ENTMCNC: 33.2 G/DL (ref 31–37)
MCV RBC AUTO: 92.4 FL (ref 80–100)
MONOCYTES # BLD AUTO: 0.89 X10(3) UL (ref 0.1–1)
MONOCYTES NFR BLD AUTO: 6.4 %
NEUTROPHILS # BLD AUTO: 10.33 X10 (3) UL (ref 1.5–7.7)
NEUTROPHILS # BLD AUTO: 10.33 X10(3) UL (ref 1.5–7.7)
NEUTROPHILS NFR BLD AUTO: 74.1 %
PLATELET # BLD AUTO: 173 10(3)UL (ref 150–450)
RBC # BLD AUTO: 2.9 X10(6)UL (ref 3.8–5.3)
WBC # BLD AUTO: 13.9 X10(3) UL (ref 4–11)

## 2019-08-31 NOTE — CM/SW NOTE
YSABEL received MDO for caregiver/teen mom resources. YSABEL met w/ pt and pt's boyfriend, Bg to discuss eventual discharge needs & provide resources. Per pt request, YSABEL added boyfriend, Ej Chapman [phone] as a secondary emergency contact.     Pt stated

## 2019-08-31 NOTE — LACTATION NOTE
This note was copied from a baby's chart. LACTATION NOTE - INFANT    Evaluation Type  Evaluation Type: Inpatient    Problems & Assessment  Problems Diagnosed or Identified: 37-38 weeks gestation; Latch difficulty  Infant Assessment: Skin color: pink or krish

## 2019-08-31 NOTE — LACTATION NOTE
LACTATION NOTE - MOTHER      Evaluation Type: Inpatient    Problems identified  Problems identified: Inverted nipple(s); Knowledge deficit; Unable to acheive sustained latch;Milk supply not WNL  Milk supply not WNL: Reduced (potential)    Maternal history  M lactogenesis II, especially during the first two weeks postpartum. Benefits and risks of using a nipple shield reviewed and strongly encouraged close follow up by infant's pediatrician and a community based lactation consultant when using a nipple shield.

## 2019-08-31 NOTE — PROGRESS NOTES
Post-Partum Note   8/31/2019, 6:40 AM    Subjective:  Patient doing well. Pain is well controlled. She is ambulating without lightheadedness or dizziness. Denies fevers or chills. Denies SOB, CP. Denies headaches and vision changes.  She feels she is doing

## 2019-09-01 VITALS
WEIGHT: 139 LBS | RESPIRATION RATE: 16 BRPM | DIASTOLIC BLOOD PRESSURE: 68 MMHG | HEART RATE: 75 BPM | OXYGEN SATURATION: 99 % | HEIGHT: 62 IN | SYSTOLIC BLOOD PRESSURE: 118 MMHG | TEMPERATURE: 99 F | BODY MASS INDEX: 25.58 KG/M2

## 2019-09-01 RX ORDER — PSEUDOEPHEDRINE HCL 30 MG
100 TABLET ORAL 2 TIMES DAILY
Qty: 60 CAPSULE | Refills: 0 | Status: SHIPPED | OUTPATIENT
Start: 2019-09-01 | End: 2020-06-18

## 2019-09-01 RX ORDER — IBUPROFEN 600 MG/1
600 TABLET ORAL EVERY 6 HOURS PRN
Qty: 60 TABLET | Refills: 0 | Status: SHIPPED | OUTPATIENT
Start: 2019-09-01 | End: 2019-10-10

## 2019-09-01 NOTE — PLAN OF CARE
Problem: Patient Centered Care  Goal: Patient preferences are identified and integrated in the patient's plan of care  Description  Interventions:  - What would you like us to know as we care for you?   - Provide timely, complete, and accurate informatio staff  Outcome: Completed     Problem: COPING  Goal: Pt/Family able to verbalize concerns and demonstrate effective coping strategies  Description  INTERVENTIONS:  - Assist patient/family to identify coping skills, available support systems and cultural an and previous experience with breast feeding.  - Provide information as needed about early infant feeding cues (e.g., rooting, lip smacking, sucking fingers/hand) versus late cue of crying.  - Discuss/demonstrate breast feeding aids (e.g., infant sling, jodi

## 2019-09-01 NOTE — PROGRESS NOTES
Post-Partum Note   9/1/2019, 8:32 AM    Subjective:  Patient doing well. Pain is well controlled. She is ambulating without lightheadedness or dizziness. Denies fevers or chills. Denies SOB, CP. Denies headaches and vision changes.     Objective:   08/31/1

## 2019-09-01 NOTE — LACTATION NOTE
LACTATION NOTE - MOTHER      Evaluation Type: Inpatient    Problems identified  Problems identified: Knowledge deficit    Maternal history  Other/comment: gest HTN    Breastfeeding goal  Breastfeeding goal: To maintain breast milk feeding per patient goal

## 2019-09-06 ENCOUNTER — NURSE ONLY (OUTPATIENT)
Dept: OBGYN CLINIC | Facility: CLINIC | Age: 19
End: 2019-09-06
Payer: MEDICAID

## 2019-09-06 VITALS
BODY MASS INDEX: 23 KG/M2 | HEART RATE: 88 BPM | WEIGHT: 124.19 LBS | DIASTOLIC BLOOD PRESSURE: 81 MMHG | SYSTOLIC BLOOD PRESSURE: 121 MMHG

## 2019-09-06 DIAGNOSIS — Z01.30 BP CHECK: Primary | ICD-10-CM

## 2019-09-06 NOTE — PROGRESS NOTES
Pt delivered 19  with NJG. Pt had gestational HTN in third trimester. Pt presents for BP check today. Pts BP was 121/81 pulse 88. Pt denies light headedness, dizziness, HA, blurred vision or right epigastric pain.  Informed pt BP is normal. Advised

## 2019-10-10 ENCOUNTER — POSTPARTUM (OUTPATIENT)
Dept: OBGYN CLINIC | Facility: CLINIC | Age: 19
End: 2019-10-10
Payer: MEDICAID

## 2019-10-10 VITALS
WEIGHT: 123 LBS | BODY MASS INDEX: 23 KG/M2 | DIASTOLIC BLOOD PRESSURE: 72 MMHG | HEART RATE: 80 BPM | SYSTOLIC BLOOD PRESSURE: 128 MMHG

## 2019-10-10 PROBLEM — Z33.1 PREGNANT STATE, INCIDENTAL: Status: RESOLVED | Noted: 2019-04-22 | Resolved: 2019-10-10

## 2019-10-10 PROBLEM — Z34.90 PREGNANCY: Status: RESOLVED | Noted: 2019-08-27 | Resolved: 2019-10-10

## 2019-10-10 PROBLEM — O13.3 GESTATIONAL HYPERTENSION, THIRD TRIMESTER: Status: RESOLVED | Noted: 2019-08-29 | Resolved: 2019-10-10

## 2019-10-10 PROBLEM — Z33.1 PREGNANT STATE, INCIDENTAL (HCC): Status: RESOLVED | Noted: 2019-04-22 | Resolved: 2019-10-10

## 2019-10-10 PROBLEM — Z34.90 PREGNANCY (HCC): Status: RESOLVED | Noted: 2019-08-27 | Resolved: 2019-10-10

## 2019-10-10 PROBLEM — O13.3 GESTATIONAL HYPERTENSION, THIRD TRIMESTER (HCC): Status: RESOLVED | Noted: 2019-08-29 | Resolved: 2019-10-10

## 2019-10-10 PROCEDURE — 0503F POSTPARTUM CARE VISIT: CPT | Performed by: OBSTETRICS & GYNECOLOGY

## 2019-10-10 RX ORDER — ACETAMINOPHEN AND CODEINE PHOSPHATE 120; 12 MG/5ML; MG/5ML
0.35 SOLUTION ORAL DAILY
Qty: 28 TABLET | Refills: 3 | Status: SHIPPED | OUTPATIENT
Start: 2019-10-10 | End: 2019-11-07

## 2019-10-10 NOTE — PROGRESS NOTES
Cyndi Morales is a 20year old female  here for 6 week post-partum visit. Patient delivered a  female infant on 2019 via spontaneous vaginal delivery. Patient desires ocps for contraception. Patient is breast & bottle feeding.    Palma masses  External Genitalia:  normal appearance, hair distribution, and no lesions  Vagina:    normal appearance without lesions, no abnormal discharge  Cervix:    normal without tenderness on motion  Uterus:    normal in size, contour, position, mobility,

## 2019-10-14 ENCOUNTER — TELEPHONE (OUTPATIENT)
Dept: OBGYN CLINIC | Facility: CLINIC | Age: 19
End: 2019-10-14

## 2019-10-14 NOTE — TELEPHONE ENCOUNTER
Pt would like to change BC to Nortrel and wants to know if this BC will dry up her milk or can she take something to dry up milk doesn't want to breast feed anymore

## 2019-10-14 NOTE — TELEPHONE ENCOUNTER
Pt states she has decided to stop breast feeding and would like to switch OCP. Pt states she will be going back to work very soon and does not want to pump at work.  Pt states she has already started weaning and started tight compression and cabbage leaves

## 2019-10-21 NOTE — TELEPHONE ENCOUNTER
Use condoms until menses start then can do first day start on notrel 1/35 -- give enough until annual due -- check PP note

## 2019-11-01 ENCOUNTER — OFFICE VISIT (OUTPATIENT)
Dept: OBGYN CLINIC | Facility: CLINIC | Age: 19
End: 2019-11-01
Payer: MEDICAID

## 2019-11-01 ENCOUNTER — TELEPHONE (OUTPATIENT)
Dept: OBGYN CLINIC | Facility: CLINIC | Age: 19
End: 2019-11-01

## 2019-11-01 VITALS
WEIGHT: 124.38 LBS | SYSTOLIC BLOOD PRESSURE: 139 MMHG | HEART RATE: 90 BPM | BODY MASS INDEX: 23 KG/M2 | DIASTOLIC BLOOD PRESSURE: 88 MMHG

## 2019-11-01 DIAGNOSIS — N89.8 VAGINAL DISCHARGE: Primary | ICD-10-CM

## 2019-11-01 PROCEDURE — 99213 OFFICE O/P EST LOW 20 MIN: CPT | Performed by: OBSTETRICS & GYNECOLOGY

## 2019-11-01 NOTE — TELEPHONE ENCOUNTER
pt. states that she is 2 months post-partum, and thinks that she may possibly have a yeast infection, so she wants to know if she will need to come in or can dr send a Rx to the pharm. ,

## 2019-11-01 NOTE — H&P
HPI:  The patient is a 19 yo F c/o vaginal irritation and dc. NO changes in soaps/detergents. No douching. No recent abx. +IC x 1 time since delivery. No odor. Thick white dc.         Reviewed medical and surgical history below       OBSTETRICS HISTOR on file    Social History Narrative      Not on file      FAMILY HISTORY:  Family History   Problem Relation Age of Onset   • Birth Defects Neg        MEDICATIONS:    Current Outpatient Medications:   •  Norethindrone-Eth Estradiol (NORTREL 1/35, 28,) 1-35

## 2019-11-01 NOTE — TELEPHONE ENCOUNTER
Pt reports thick white discharge with odor since yesterday. Reports itching and irritation. Advised pt she needs to be seen. Pt accepted appt at 2 pm today.

## 2019-11-02 ENCOUNTER — TELEPHONE (OUTPATIENT)
Dept: OBGYN CLINIC | Facility: CLINIC | Age: 19
End: 2019-11-02

## 2019-11-02 NOTE — TELEPHONE ENCOUNTER
Pt requesting results for vaginal Cx. Informed pt results are back for BV and trich and both were negative. Informed pt results are still pending for yeast. Informed pt to call on Monday (11/4/19) for results. Pt verbalized understanding.

## 2019-11-04 ENCOUNTER — TELEPHONE (OUTPATIENT)
Dept: PEDIATRICS CLINIC | Facility: CLINIC | Age: 19
End: 2019-11-04

## 2019-11-06 RX ORDER — FLUCONAZOLE 150 MG/1
TABLET ORAL
Qty: 2 TABLET | Refills: 0 | Status: SHIPPED | OUTPATIENT
Start: 2019-11-06 | End: 2020-06-18

## 2020-03-14 ENCOUNTER — TELEPHONE (OUTPATIENT)
Dept: OBGYN CLINIC | Facility: CLINIC | Age: 20
End: 2020-03-14

## 2020-06-12 RX ORDER — NORETHINDRONE AND ETHINYL ESTRADIOL 1 MG-35MCG
KIT ORAL
Qty: 28 TABLET | Refills: 0 | OUTPATIENT
Start: 2020-06-12

## 2020-06-15 ENCOUNTER — TELEPHONE (OUTPATIENT)
Dept: OBGYN CLINIC | Facility: CLINIC | Age: 20
End: 2020-06-15

## 2020-06-15 NOTE — TELEPHONE ENCOUNTER
Pt requesting OCP rx refill. States she was supposed to start a new pack yesterday. Informed pt she was due for annual in April. Assisted pt with scheduling annual on 6/18.  OCP sent for one pack and instructed pt to take 2 pills today and then 1 pill daily

## 2020-06-18 ENCOUNTER — OFFICE VISIT (OUTPATIENT)
Dept: OBGYN CLINIC | Facility: CLINIC | Age: 20
End: 2020-06-18
Payer: MEDICAID

## 2020-06-18 VITALS
DIASTOLIC BLOOD PRESSURE: 80 MMHG | WEIGHT: 125.19 LBS | BODY MASS INDEX: 23 KG/M2 | SYSTOLIC BLOOD PRESSURE: 137 MMHG | HEART RATE: 75 BPM

## 2020-06-18 DIAGNOSIS — Z01.419 ENCOUNTER FOR GYNECOLOGICAL EXAMINATION WITHOUT ABNORMAL FINDING: Primary | ICD-10-CM

## 2020-06-18 DIAGNOSIS — Z12.4 SCREENING FOR MALIGNANT NEOPLASM OF CERVIX: ICD-10-CM

## 2020-06-18 DIAGNOSIS — Z30.41 ORAL CONTRACEPTIVE PILL SURVEILLANCE: ICD-10-CM

## 2020-06-18 PROCEDURE — 99395 PREV VISIT EST AGE 18-39: CPT | Performed by: OBSTETRICS & GYNECOLOGY

## 2020-06-19 NOTE — PROGRESS NOTES
Kavita Hernandez is a 23year old female [id number] Patient's last menstrual period was 2020. Patient presents with:  Gyn Exam: ANNUAL  Medication Request: ocps  .     OBSTETRICS HISTORY:  OB History    Para Term  AB Living   1 1 1     1 Attends meetings of clubs or organizations: Not on file        Relationship status: Not on file      Intimate partner violence:        Fear of current or ex partner: Not on file        Emotionally abused: Not on file        Physically abused: Not on file oriented to time, place, person and situation.  Appropriate mood and affect    Pelvic Exam:  External Genitalia:  normal appearance, hair distribution, and no lesions  Urethral Meatus:   normal in size, location, without lesions and prolapse  Bladder:    n

## 2020-06-30 ENCOUNTER — TELEPHONE (OUTPATIENT)
Dept: OBGYN CLINIC | Facility: CLINIC | Age: 20
End: 2020-06-30

## 2020-06-30 NOTE — TELEPHONE ENCOUNTER
----- Message from Nicole Robb MD sent at 6/25/2020  9:35 PM CDT -----  Why was HPV done on this pap? Please investigate. Error from our office or lab?

## 2020-10-15 ENCOUNTER — HOSPITAL ENCOUNTER (EMERGENCY)
Facility: HOSPITAL | Age: 20
Discharge: HOME | End: 2020-10-15
Attending: EMERGENCY MEDICINE
Payer: MEDICAID

## 2020-10-15 ENCOUNTER — APPOINTMENT (OUTPATIENT)
Dept: GENERAL RADIOLOGY | Facility: HOSPITAL | Age: 20
End: 2020-10-15
Attending: EMERGENCY MEDICINE
Payer: MEDICAID

## 2020-10-15 VITALS
SYSTOLIC BLOOD PRESSURE: 118 MMHG | HEART RATE: 67 BPM | DIASTOLIC BLOOD PRESSURE: 80 MMHG | TEMPERATURE: 98 F | OXYGEN SATURATION: 98 % | RESPIRATION RATE: 20 BRPM

## 2020-10-15 DIAGNOSIS — S00.83XA CONTUSION OF JAW, INITIAL ENCOUNTER: ICD-10-CM

## 2020-10-15 DIAGNOSIS — S80.01XA CONTUSION OF RIGHT KNEE AND LOWER LEG, INITIAL ENCOUNTER: Primary | ICD-10-CM

## 2020-10-15 DIAGNOSIS — S80.11XA CONTUSION OF RIGHT KNEE AND LOWER LEG, INITIAL ENCOUNTER: Primary | ICD-10-CM

## 2020-10-15 PROCEDURE — 73560 X-RAY EXAM OF KNEE 1 OR 2: CPT | Performed by: EMERGENCY MEDICINE

## 2020-10-15 PROCEDURE — 99284 EMERGENCY DEPT VISIT MOD MDM: CPT

## 2020-10-15 PROCEDURE — 70355 PANORAMIC X-RAY OF JAWS: CPT | Performed by: EMERGENCY MEDICINE

## 2020-10-15 NOTE — ED NOTES
Pt reports falling down approximately stairs while leaving for work pt does have noticeable redness and a pump on the right knee

## 2020-10-15 NOTE — ED PROVIDER NOTES
Patient Seen in: [facility] AND [facility] Emergency Department      History   Patient presents with:  Fall    Stated Complaint: fall r leg pain    HPI    The patient is a 71-year-old female who tripped down 4 stairs yesterday morning on her way to work.   She la Mouth: Mucous membranes are moist.      Dentition: Normal dentition. Eyes:      General: Lids are normal.      Extraocular Movements: Extraocular movements intact. Left eye: No nystagmus.       Conjunctiva/sclera: Conjunctivae normal.      Pupils: Labs Reviewed - No data to display               MDM      Pulse Ox: 100%, Normal, room air        Radiology findings:XRAY 11 [facility], 1 IMAGE  10/15/2020 at 0452 hours      IMPRESSION:    Lucency in the right mandibular head/neck region is favored t

## 2020-10-15 NOTE — ED INITIAL ASSESSMENT (HPI)
S: Fall. B: Patient c/o right knee pain and left jaw pain after falling down 4 metal stairs yesterday. Mechanical fall. A: Patient is ambulatory. Unable to visualize knee during triage.

## 2021-05-15 ENCOUNTER — TELEPHONE (OUTPATIENT)
Dept: OBGYN CLINIC | Facility: CLINIC | Age: 21
End: 2021-05-15

## 2021-05-15 RX ORDER — NORETHINDRONE AND ETHINYL ESTRADIOL 1 MG-35MCG
1 KIT ORAL DAILY
Qty: 1 PACKAGE | Refills: 2 | Status: SHIPPED | OUTPATIENT
Start: 2021-05-15 | End: 2021-07-02

## 2021-05-15 NOTE — TELEPHONE ENCOUNTER
Informed pt nurse will sent the OCP rx refill to cover pt to annual on 7/2/21. Last annual was 6/2020.

## 2021-05-15 NOTE — TELEPHONE ENCOUNTER
Patient went to her pharmacy to get a refill of her birth control and they would not give it to her claiming that they gave her two months the last time she picked it up.   She only received one and needs birth control ordered to bridge the gap until her ne

## 2021-07-02 ENCOUNTER — OFFICE VISIT (OUTPATIENT)
Dept: OBGYN CLINIC | Facility: CLINIC | Age: 21
End: 2021-07-02
Payer: MEDICAID

## 2021-07-02 VITALS
HEIGHT: 63.7 IN | SYSTOLIC BLOOD PRESSURE: 118 MMHG | WEIGHT: 143 LBS | HEART RATE: 92 BPM | DIASTOLIC BLOOD PRESSURE: 72 MMHG | BODY MASS INDEX: 24.71 KG/M2

## 2021-07-02 DIAGNOSIS — Z30.41 ORAL CONTRACEPTIVE PILL SURVEILLANCE: ICD-10-CM

## 2021-07-02 DIAGNOSIS — Z01.419 ENCOUNTER FOR GYNECOLOGICAL EXAMINATION WITHOUT ABNORMAL FINDING: Primary | ICD-10-CM

## 2021-07-02 PROCEDURE — 99395 PREV VISIT EST AGE 18-39: CPT | Performed by: OBSTETRICS & GYNECOLOGY

## 2021-07-02 PROCEDURE — 3074F SYST BP LT 130 MM HG: CPT | Performed by: OBSTETRICS & GYNECOLOGY

## 2021-07-02 PROCEDURE — 3008F BODY MASS INDEX DOCD: CPT | Performed by: OBSTETRICS & GYNECOLOGY

## 2021-07-02 PROCEDURE — 3078F DIAST BP <80 MM HG: CPT | Performed by: OBSTETRICS & GYNECOLOGY

## 2021-07-02 RX ORDER — NORETHINDRONE AND ETHINYL ESTRADIOL 1 MG-35MCG
1 KIT ORAL DAILY
Qty: 3 EACH | Refills: 3 | Status: SHIPPED | OUTPATIENT
Start: 2021-07-02

## 2021-07-02 NOTE — PROGRESS NOTES
Berry Hernandez is a 24year old female [id number] Patient's last menstrual period was 2021.  Patient presents with:  Gyn Exam: ANNUAL EXAM / BCP REFILL  Medication Request  .    OBSTETRICS HISTORY:  OB History    Para Term  AB Living denies depression or anxiety, thoughts of harming self or others  Heme/Lymph:    denies easy bruising or bleeding      PHYSICAL EXAM:   Blood pressure 118/72, pulse 92, height 5' 3.7\" (1.618 m), weight 143 lb (64.9 kg), last menstrual period 06/07/2021,

## 2021-08-25 ENCOUNTER — HOSPITAL ENCOUNTER (EMERGENCY)
Facility: HOSPITAL | Age: 21
Discharge: HOME | End: 2021-08-25
Attending: EMERGENCY MEDICINE
Payer: MEDICAID

## 2021-08-25 ENCOUNTER — APPOINTMENT (OUTPATIENT)
Dept: GENERAL RADIOLOGY | Facility: HOSPITAL | Age: 21
End: 2021-08-25
Attending: EMERGENCY MEDICINE
Payer: MEDICAID

## 2021-08-25 VITALS
DIASTOLIC BLOOD PRESSURE: 81 MMHG | SYSTOLIC BLOOD PRESSURE: 151 MMHG | BODY MASS INDEX: 24 KG/M2 | HEART RATE: 87 BPM | OXYGEN SATURATION: 99 % | TEMPERATURE: 98 F | WEIGHT: 140 LBS | RESPIRATION RATE: 18 BRPM

## 2021-08-25 DIAGNOSIS — S62.646B OPEN NONDISPLACED FRACTURE OF PROXIMAL PHALANX OF RIGHT LITTLE FINGER, INITIAL ENCOUNTER: ICD-10-CM

## 2021-08-25 DIAGNOSIS — S61.412A LACERATION OF LEFT HAND WITHOUT FOREIGN BODY, INITIAL ENCOUNTER: ICD-10-CM

## 2021-08-25 DIAGNOSIS — S61.216A LACERATION OF RIGHT LITTLE FINGER WITHOUT FOREIGN BODY WITHOUT DAMAGE TO NAIL, INITIAL ENCOUNTER: Primary | ICD-10-CM

## 2021-08-25 PROCEDURE — 12002 RPR S/N/AX/GEN/TRNK2.6-7.5CM: CPT

## 2021-08-25 PROCEDURE — 99283 EMERGENCY DEPT VISIT LOW MDM: CPT

## 2021-08-25 PROCEDURE — 26720 TREAT FINGER FRACTURE EACH: CPT

## 2021-08-25 PROCEDURE — 73140 X-RAY EXAM OF FINGER(S): CPT | Performed by: EMERGENCY MEDICINE

## 2021-08-25 RX ORDER — AMOXICILLIN AND CLAVULANATE POTASSIUM 875; 125 MG/1; MG/1
1 TABLET, FILM COATED ORAL 2 TIMES DAILY
Qty: 20 TABLET | Refills: 0 | Status: SHIPPED | OUTPATIENT
Start: 2021-08-25 | End: 2021-09-04

## 2021-08-25 NOTE — ED INITIAL ASSESSMENT (HPI)
Patient complains of R 5th finger lac, patient refuses to answer what happened and how she got the lac

## 2021-08-25 NOTE — ED PROVIDER NOTES
Patient Seen in: [facility] AND [facility] Emergency Department      History   Patient presents with:  Laceration    Stated Complaint: finger lac    HPI/Subjective:   HPI  Patient is a healthy 48-year-old female presenting with finger laceration.  Patient reports distress. Breath sounds: Normal breath sounds. Abdominal:      General: There is no distension. Tenderness: There is no abdominal tenderness. There is no guarding or rebound. Musculoskeletal:         General: No swelling or deformity.       Ce laceration located R 5th digit was anesthetized in the usual fashion. The wound was scrubbed, draped and explored. There were no deep structures involved. No tendon injury was identified. The wound was repaired with 7 simple interrupted sutures .   The

## 2021-08-28 NOTE — CM/SW NOTE
Received smart ER with patient requesting assistance with finding ortho doc for f/u. Pt states the [facility] [place] [facility] [place] [address] and patient states she wanted a f/u closer to home. Found ortho in [place]:     Melissa [place].  [id number] [employer]

## 2021-09-02 ENCOUNTER — HOSPITAL ENCOUNTER (OUTPATIENT)
Age: 21
Discharge: HOME | End: 2021-09-02
Payer: MEDICAID

## 2021-09-02 VITALS
HEART RATE: 73 BPM | SYSTOLIC BLOOD PRESSURE: 129 MMHG | OXYGEN SATURATION: 100 % | RESPIRATION RATE: 20 BRPM | DIASTOLIC BLOOD PRESSURE: 69 MMHG | TEMPERATURE: 98 F

## 2021-09-02 DIAGNOSIS — Z48.02 ENCOUNTER FOR REMOVAL OF SUTURES: Primary | ICD-10-CM
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
## 2021-09-08 ENCOUNTER — HOSPITAL ENCOUNTER (OUTPATIENT)
Age: 21
Discharge: HOME | End: 2021-09-08
Payer: MEDICAID

## 2021-09-08 VITALS
HEART RATE: 80 BPM | DIASTOLIC BLOOD PRESSURE: 72 MMHG | RESPIRATION RATE: 18 BRPM | SYSTOLIC BLOOD PRESSURE: 142 MMHG | OXYGEN SATURATION: 100 % | TEMPERATURE: 98 F

## 2021-09-08 DIAGNOSIS — Z48.02 ENCOUNTER FOR REMOVAL OF SUTURES: Primary | ICD-10-CM

## 2021-09-08 NOTE — ED PROVIDER NOTES
Patient Seen in: [facility]      History   Patient presents with:  Sut Stap Pina    Stated Complaint: stiches removal     HPI/Subjective:   HPI    25 yo female here for suture removal.  Pt had sutures placed to the R 5th digit 14 days General: Skin is warm. Neurological:      General: No focal deficit present. Mental Status: She is alert and oriented to person, place, and time.    Psychiatric:         Mood and Affect: Mood normal.         Behavior: Behavior normal.             ED

## 2021-09-08 NOTE — ED INITIAL ASSESSMENT (HPI)
Right 5th finger fx with sutures placed 8/25/21. Splint in place. Sutures intact, well approximated. No signs of infection.  Here for suture removal.

## 2021-10-23 ENCOUNTER — HOSPITAL ENCOUNTER (EMERGENCY)
Facility: HOSPITAL | Age: 21
Discharge: HOME | End: 2021-10-23
Payer: MEDICAID

## 2021-10-23 ENCOUNTER — APPOINTMENT (OUTPATIENT)
Dept: GENERAL RADIOLOGY | Facility: HOSPITAL | Age: 21
End: 2021-10-23
Payer: MEDICAID

## 2021-10-23 VITALS
TEMPERATURE: 99 F | HEART RATE: 100 BPM | OXYGEN SATURATION: 98 % | WEIGHT: 140 LBS | RESPIRATION RATE: 18 BRPM | BODY MASS INDEX: 24 KG/M2 | SYSTOLIC BLOOD PRESSURE: 126 MMHG | DIASTOLIC BLOOD PRESSURE: 76 MMHG

## 2021-10-23 DIAGNOSIS — S61.512A LACERATION OF LEFT WRIST, INITIAL ENCOUNTER: Primary | ICD-10-CM

## 2021-10-23 PROCEDURE — 73110 X-RAY EXAM OF WRIST: CPT

## 2021-10-23 PROCEDURE — 99284 EMERGENCY DEPT VISIT MOD MDM: CPT

## 2021-10-23 PROCEDURE — 12001 RPR S/N/AX/GEN/TRNK 2.5CM/<: CPT
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
## 2021-10-24 NOTE — ED PROVIDER NOTES
Patient Seen in: [facility] AND [facility] Emergency Department      History   Patient presents with:  Arm or Hand Injury  Laceration/Abrasion    Stated Complaint: Wrist Lac    Subjective:   20yo/f with no chronic medical problems reports with a left dorsal wris regular rhythm. Heart sounds: Normal heart sounds. Pulmonary:      Effort: Pulmonary effort is normal.      Breath sounds: Normal breath sounds. Abdominal:      General: Bowel sounds are normal.      Palpations: Abdomen is soft.    Musculoskeletal: 0

## 2021-10-30 ENCOUNTER — HOSPITAL ENCOUNTER (EMERGENCY)
Facility: HOSPITAL | Age: 21
Discharge: HOME | End: 2021-10-30
Attending: EMERGENCY MEDICINE
Payer: MEDICAID

## 2021-10-30 VITALS
RESPIRATION RATE: 17 BRPM | OXYGEN SATURATION: 99 % | BODY MASS INDEX: 23.92 KG/M2 | TEMPERATURE: 98 F | HEART RATE: 70 BPM | HEIGHT: 62 IN | WEIGHT: 130 LBS | DIASTOLIC BLOOD PRESSURE: 82 MMHG | SYSTOLIC BLOOD PRESSURE: 131 MMHG

## 2021-10-30 DIAGNOSIS — Z48.02 ENCOUNTER FOR REMOVAL OF SUTURES: Primary | ICD-10-CM

## 2021-10-30 NOTE — ED PROVIDER NOTES
Patient presents with:  Iman Heller      HPI:     Pamella Conterras is a 24year old female presents for suture removal removal. Injury occurred 7 days ago. The patient denies wound problems or concerns.      ROS:   Pertinent positives as above

## 2021-11-24 ENCOUNTER — HOSPITAL ENCOUNTER (EMERGENCY)
Facility: HOSPITAL | Age: 21
Discharge: HOME | End: 2021-11-24
Attending: EMERGENCY MEDICINE
Payer: MEDICAID

## 2021-11-24 VITALS
SYSTOLIC BLOOD PRESSURE: 156 MMHG | BODY MASS INDEX: 23.92 KG/M2 | DIASTOLIC BLOOD PRESSURE: 81 MMHG | WEIGHT: 130 LBS | OXYGEN SATURATION: 98 % | HEART RATE: 109 BPM | HEIGHT: 62 IN | RESPIRATION RATE: 18 BRPM | TEMPERATURE: 99 F

## 2021-11-24 DIAGNOSIS — S01.511A LIP LACERATION, INITIAL ENCOUNTER: Primary | ICD-10-CM

## 2021-11-24 DIAGNOSIS — S02.5XXB OPEN FRACTURE OF TOOTH, INITIAL ENCOUNTER: ICD-10-CM

## 2021-11-24 PROCEDURE — 99283 EMERGENCY DEPT VISIT LOW MDM: CPT

## 2021-11-24 PROCEDURE — 12011 RPR F/E/E/N/L/M 2.5 CM/<: CPT

## 2021-11-24 RX ORDER — PENICILLIN V POTASSIUM 500 MG/1
500 TABLET ORAL 2 TIMES DAILY
Qty: 14 TABLET | Refills: 0 | Status: SHIPPED | OUTPATIENT
Start: 2021-11-24 | End: 2021-12-01
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
## 2021-11-25 NOTE — ED PROVIDER NOTES
Patient Seen in: [facility] AND [facility] Emergency Department      History   Patient presents with:  Laceration/Abrasion    Stated Complaint: Facial Lac     Subjective:   HPI    19-year-old female with no significant past medical history here with complaints o Pulse 109   Resp 18   Temp 99.3 °F (37.4 °C)   Temp src Temporal   SpO2 98 %   O2 Device None (Room air)       Current:/81   Pulse 109   Temp 99.3 °F (37.4 °C) (Temporal)   Resp 18   Ht 157.5 cm (5' 2\")   Wt 59 kg   LMP 11/24/2021   SpO2 98%   BMI with good approximation. The patient was neurovascularly intact after closure. No complications were noted. Sterile dressing applied.         PROCEDURE:  Laceration Repair  : Parris Sahu MD  Length:  1cm  Location:  Lower lip    The patient / c pressure re-checked within 1 week was provided. Medical Record Review: I personally reviewed available prior medical records for any recent pertinent discharge summaries, testing, and procedures, and reviewed those reports. Complicating Factors:  The

## 2022-08-15 ENCOUNTER — TELEPHONE (OUTPATIENT)
Dept: OBGYN CLINIC | Facility: CLINIC | Age: 22
End: 2022-08-15

## 2022-08-15 NOTE — TELEPHONE ENCOUNTER
Pt confirms +HPT and LMP of 7/6. Pt states she has regular cycles, every 28 days. Pt advised to start PNV with DHa, folic acid, and iron. Pt delivered with our practice before and is familiar with seeing all providers, male and female. Pt scheduled OBN appt for 8/31.

## 2022-08-21 ENCOUNTER — APPOINTMENT (OUTPATIENT)
Dept: ULTRASOUND IMAGING | Facility: HOSPITAL | Age: 22
End: 2022-08-21
Attending: EMERGENCY MEDICINE
Payer: MEDICAID

## 2022-08-21 ENCOUNTER — HOSPITAL ENCOUNTER (EMERGENCY)
Facility: HOSPITAL | Age: 22
Discharge: HOME | End: 2022-08-21
Attending: EMERGENCY MEDICINE
Payer: MEDICAID

## 2022-08-21 VITALS
WEIGHT: 130 LBS | HEART RATE: 89 BPM | SYSTOLIC BLOOD PRESSURE: 125 MMHG | OXYGEN SATURATION: 100 % | HEIGHT: 62 IN | DIASTOLIC BLOOD PRESSURE: 66 MMHG | RESPIRATION RATE: 18 BRPM | TEMPERATURE: 98 F | BODY MASS INDEX: 23.92 KG/M2

## 2022-08-21 DIAGNOSIS — O20.9 VAGINAL BLEEDING IN PREGNANCY, FIRST TRIMESTER: Primary | ICD-10-CM

## 2022-08-21 DIAGNOSIS — O30.001 TWIN GESTATION IN FIRST TRIMESTER, UNSPECIFIED MULTIPLE GESTATION TYPE: ICD-10-CM

## 2022-08-21 DIAGNOSIS — O41.8X11 SUBCHORIONIC HEMORRHAGE OF PLACENTA IN FIRST TRIMESTER, FETUS 1 OF MULTIPLE GESTATION: ICD-10-CM

## 2022-08-21 DIAGNOSIS — O46.8X1 SUBCHORIONIC HEMORRHAGE OF PLACENTA IN FIRST TRIMESTER, FETUS 1 OF MULTIPLE GESTATION: ICD-10-CM

## 2022-08-21 LAB
B-HCG SERPL-ACNC: ABNORMAL MIU/ML
B-HCG UR QL: POSITIVE
BASOPHILS # BLD AUTO: 0.04 X10(3) UL (ref 0–0.2)
BASOPHILS NFR BLD AUTO: 0.6 %
BILIRUB UR QL: NEGATIVE
CLARITY UR: CLEAR
COLOR UR: YELLOW
DEPRECATED RDW RBC AUTO: 39.8 FL (ref 35.1–46.3)
EOSINOPHIL # BLD AUTO: 0.08 X10(3) UL (ref 0–0.7)
EOSINOPHIL NFR BLD AUTO: 1.3 %
ERYTHROCYTE [DISTWIDTH] IN BLOOD BY AUTOMATED COUNT: 11.7 % (ref 11–15)
GLUCOSE UR-MCNC: NEGATIVE MG/DL
HCT VFR BLD AUTO: 38.4 %
HGB BLD-MCNC: 13.1 G/DL
IMM GRANULOCYTES # BLD AUTO: 0.01 X10(3) UL (ref 0–1)
IMM GRANULOCYTES NFR BLD: 0.2 %
LYMPHOCYTES # BLD AUTO: 2.47 X10(3) UL (ref 1–4)
LYMPHOCYTES NFR BLD AUTO: 39.6 %
MCH RBC QN AUTO: 31.9 PG (ref 26–34)
MCHC RBC AUTO-ENTMCNC: 34.1 G/DL (ref 31–37)
MCV RBC AUTO: 93.4 FL
MONOCYTES # BLD AUTO: 0.54 X10(3) UL (ref 0.1–1)
MONOCYTES NFR BLD AUTO: 8.7 %
NEUTROPHILS # BLD AUTO: 3.09 X10 (3) UL (ref 1.5–7.7)
NEUTROPHILS # BLD AUTO: 3.09 X10(3) UL (ref 1.5–7.7)
NEUTROPHILS NFR BLD AUTO: 49.6 %
NITRITE UR QL STRIP.AUTO: NEGATIVE
PH UR: 6.5 [PH] (ref 5–8)
PLATELET # BLD AUTO: 262 10(3)UL (ref 150–450)
RBC # BLD AUTO: 4.11 X10(6)UL
RH BLOOD TYPE: POSITIVE
SP GR UR STRIP: 1.02 (ref 1–1.03)
UROBILINOGEN UR STRIP-ACNC: 1
WBC # BLD AUTO: 6.2 X10(3) UL (ref 4–11)

## 2022-08-21 PROCEDURE — 81001 URINALYSIS AUTO W/SCOPE: CPT | Performed by: EMERGENCY MEDICINE

## 2022-08-21 PROCEDURE — 99284 EMERGENCY DEPT VISIT MOD MDM: CPT

## 2022-08-21 PROCEDURE — 81025 URINE PREGNANCY TEST: CPT

## 2022-08-21 PROCEDURE — 85025 COMPLETE CBC W/AUTO DIFF WBC: CPT | Performed by: EMERGENCY MEDICINE

## 2022-08-21 PROCEDURE — 84702 CHORIONIC GONADOTROPIN TEST: CPT | Performed by: EMERGENCY MEDICINE

## 2022-08-21 PROCEDURE — 76801 OB US < 14 WKS SINGLE FETUS: CPT | Performed by: EMERGENCY MEDICINE

## 2022-08-21 PROCEDURE — 86901 BLOOD TYPING SEROLOGIC RH(D): CPT | Performed by: EMERGENCY MEDICINE

## 2022-08-21 PROCEDURE — 76802 OB US < 14 WKS ADDL FETUS: CPT | Performed by: EMERGENCY MEDICINE

## 2022-08-21 PROCEDURE — 81015 MICROSCOPIC EXAM OF URINE: CPT | Performed by: EMERGENCY MEDICINE

## 2022-08-21 PROCEDURE — 36415 COLL VENOUS BLD VENIPUNCTURE: CPT

## 2022-08-21 PROCEDURE — 87086 URINE CULTURE/COLONY COUNT: CPT | Performed by: EMERGENCY MEDICINE

## 2022-08-21 PROCEDURE — 86900 BLOOD TYPING SEROLOGIC ABO: CPT | Performed by: EMERGENCY MEDICINE

## 2022-08-21 PROCEDURE — 76817 TRANSVAGINAL US OBSTETRIC: CPT | Performed by: EMERGENCY MEDICINE

## 2022-08-21 RX ORDER — MULTIVIT,IRON,MINERALS/LUTEIN
TABLET ORAL
COMMUNITY

## 2022-08-21 NOTE — ED INITIAL ASSESSMENT (HPI)
LMP 07/06 positive home pregnancy test. Patient having vaginal spotting since last week on and off. Patient has first OB appointment 08/31.  Patient denies cramping or abdominal pain

## 2022-08-31 ENCOUNTER — NURSE ONLY (OUTPATIENT)
Dept: OBGYN CLINIC | Facility: CLINIC | Age: 22
End: 2022-08-31
Payer: MEDICAID

## 2022-08-31 DIAGNOSIS — Z34.81 ENCOUNTER FOR SUPERVISION OF OTHER NORMAL PREGNANCY IN FIRST TRIMESTER: Primary | ICD-10-CM

## 2022-08-31 RX ORDER — CHOLECALCIFEROL (VITAMIN D3) 25 MCG
1 TABLET,CHEWABLE ORAL DAILY
COMMUNITY

## 2022-08-31 NOTE — PROGRESS NOTES
Patient dating 7w3d by US- twin gestation. She was seen in ER on 8/21/22 for spotting. Pt reports no further spotting since then. Reviewed Vit B6 25 mg, up to 4 times daily for a maximum of 100 mg and then at night take 1/2 a tablet of Unisom. Reviewed frequent snacks that are high in protein. Patient verbalized understanding. She desires FTS and wants to discuss early testing to determine sex of baby. Advised this is not commonly covered by ins. Normal PN labs ordered plus varicella. Pt advised all labs must be completed and resulted prior to MD appt. Patient scheduled 9/12/22 with [facility]    HT 5'2\"   lbs  BMI 23.8     Partner's name is Jessica Chakraborty contact #[phone]; race:  Christina  Occupation: stay at home    MEDICAL HISTORY    Anemia No    Anesthetic complications No    Anxiety/Depression  No    Autoimmune Disorder No    Asthma  No    Cancer No    Diabetes  No    Gyne/breast Surgery No    Heart Disease No    Hepatitis/Liver Disease  No    History of blood transfusion No    History of abnormal pap No    Hypertension  No    Infertility  No    Kidney Disease/Frequent UTIs  No    Medication Allergies No    Latex Allergies No    Food Allergies  No    Neurological Disorder/Epilepsy No    Operations/Hospitalizations No    TB exposure  No    Thyroid Dysfunction No    Trauma/Violence  No    Uterine Anomaly  No    Uterine Fibroids  No    Variocosities/DVTs No    Smoker No    Drug usage in prior year No    Alcohol No    Would you accept a blood transfusion? Yes                INFECTION HISTORY    Chlamydia Yes 2018, 2019. Received treatment. Neg on last screen.     Pt or partner have hx of Genital Herpes No    Gonorrhea No    Hepatitis B No    HIV No    HPV No    MRSA No    Syphilis No    Tattoos Yes    Live with someone or Exposed to TB No    Rash or viral illness since LMP  No    Varicella No    Recent Travel (or planned travel) to [facility] for self and or partner No    Pets No        GENETICS SCREENING    Genetic Screening    Genetic Screening/Teratology Counseling- Includes patient, baby's father, or anyone in either family with:  Patient's age 28 years or older as of estimated date of delivery: No   Thalassemia ([facility], [place], [address], or  background): MCV less than 80: No   Neural tube defect (Meningomyelocele, Spina bifida, or Anencephaly): No   Congenital heart defect: No   Down syndrome: No   Kevin-Sachs (Ashkenazi Sikh, [place], [place]): No   Canavan disease (Ashkenazi Sikh): No   Familial dysautonomia (Ashkenazi Sikh): No   Sickle cell disease or trait (): No   Hemophilia or other blood disorders: No   Muscular dystrophy: No    Cystic fibrosis: No   Warren's chorea: No   Intellectual disability and/or autism: No   Other inherited genetic or chromosomal disorder: No   Maternal metabolic disorder (eg. Type 1 diabetes, PKU): No   Patient or baby's father had child with birth defects not listed above: No   Recurrent pregnancy loss, or a stillbirth: No   Medications (including supplements, vitamins, herbs, or OTC drugs)/illicit/recreational drugs/alcohol since last menstrual period: No           MISC    Infant vaccinations  Yes     Pt. Has answered NO 5P questions and has NO  risk factors. Pt. Given What pregnant women need to know handout.

## 2022-09-02 ENCOUNTER — LAB ENCOUNTER (OUTPATIENT)
Dept: LAB | Facility: HOSPITAL | Age: 22
End: 2022-09-02
Attending: OBSTETRICS & GYNECOLOGY
Payer: MEDICAID

## 2022-09-02 DIAGNOSIS — Z34.81 ENCOUNTER FOR SUPERVISION OF OTHER NORMAL PREGNANCY IN FIRST TRIMESTER: ICD-10-CM

## 2022-09-02 LAB
ANTIBODY SCREEN: NEGATIVE
BASOPHILS # BLD AUTO: 0.05 X10(3) UL (ref 0–0.2)
BASOPHILS NFR BLD AUTO: 0.7 %
DEPRECATED RDW RBC AUTO: 41 FL (ref 35.1–46.3)
EOSINOPHIL # BLD AUTO: 0.04 X10(3) UL (ref 0–0.7)
EOSINOPHIL NFR BLD AUTO: 0.6 %
ERYTHROCYTE [DISTWIDTH] IN BLOOD BY AUTOMATED COUNT: 11.9 % (ref 11–15)
HBV SURFACE AG SER-ACNC: <0.1 [IU]/L
HBV SURFACE AG SERPL QL IA: NONREACTIVE
HCT VFR BLD AUTO: 41.1 %
HCV AB SERPL QL IA: NONREACTIVE
HGB BLD-MCNC: 13.8 G/DL
IMM GRANULOCYTES # BLD AUTO: 0.02 X10(3) UL (ref 0–1)
IMM GRANULOCYTES NFR BLD: 0.3 %
LYMPHOCYTES # BLD AUTO: 2.32 X10(3) UL (ref 1–4)
LYMPHOCYTES NFR BLD AUTO: 32.3 %
MCH RBC QN AUTO: 31.4 PG (ref 26–34)
MCHC RBC AUTO-ENTMCNC: 33.6 G/DL (ref 31–37)
MCV RBC AUTO: 93.4 FL
MONOCYTES # BLD AUTO: 0.63 X10(3) UL (ref 0.1–1)
MONOCYTES NFR BLD AUTO: 8.8 %
NEUTROPHILS # BLD AUTO: 4.12 X10 (3) UL (ref 1.5–7.7)
NEUTROPHILS # BLD AUTO: 4.12 X10(3) UL (ref 1.5–7.7)
NEUTROPHILS NFR BLD AUTO: 57.3 %
PLATELET # BLD AUTO: 285 10(3)UL (ref 150–450)
RBC # BLD AUTO: 4.4 X10(6)UL
RH BLOOD TYPE: POSITIVE
RUBV IGG SER QL: POSITIVE
RUBV IGG SER-ACNC: 99.4 IU/ML (ref 10–?)
WBC # BLD AUTO: 7.2 X10(3) UL (ref 4–11)

## 2022-09-02 PROCEDURE — 87389 HIV-1 AG W/HIV-1&-2 AB AG IA: CPT

## 2022-09-02 PROCEDURE — 87340 HEPATITIS B SURFACE AG IA: CPT

## 2022-09-02 PROCEDURE — 86780 TREPONEMA PALLIDUM: CPT

## 2022-09-02 PROCEDURE — 86787 VARICELLA-ZOSTER ANTIBODY: CPT

## 2022-09-02 PROCEDURE — 86900 BLOOD TYPING SEROLOGIC ABO: CPT

## 2022-09-02 PROCEDURE — 36415 COLL VENOUS BLD VENIPUNCTURE: CPT

## 2022-09-02 PROCEDURE — 86901 BLOOD TYPING SEROLOGIC RH(D): CPT

## 2022-09-02 PROCEDURE — 86803 HEPATITIS C AB TEST: CPT

## 2022-09-02 PROCEDURE — 86850 RBC ANTIBODY SCREEN: CPT

## 2022-09-02 PROCEDURE — 87086 URINE CULTURE/COLONY COUNT: CPT

## 2022-09-02 PROCEDURE — 85025 COMPLETE CBC W/AUTO DIFF WBC: CPT

## 2022-09-02 PROCEDURE — 86762 RUBELLA ANTIBODY: CPT

## 2022-09-05 LAB
T PALLIDUM AB SER QL: NEGATIVE
VZV IGG SER IA-ACNC: 750.8 (ref 165–?)

## 2022-09-20 ENCOUNTER — INITIAL PRENATAL (OUTPATIENT)
Dept: OBGYN CLINIC | Facility: CLINIC | Age: 22
End: 2022-09-20

## 2022-09-20 VITALS
SYSTOLIC BLOOD PRESSURE: 155 MMHG | HEART RATE: 121 BPM | DIASTOLIC BLOOD PRESSURE: 89 MMHG | BODY MASS INDEX: 23 KG/M2 | WEIGHT: 123.19 LBS

## 2022-09-20 DIAGNOSIS — Z34.91 ENCOUNTER FOR SUPERVISION OF NORMAL PREGNANCY IN FIRST TRIMESTER, UNSPECIFIED GRAVIDITY: Primary | ICD-10-CM

## 2022-09-20 LAB
APPEARANCE: CLEAR
BILIRUBIN: NEGATIVE
GLUCOSE (URINE DIPSTICK): NEGATIVE MG/DL
MULTISTIX LOT#: ABNORMAL NUMERIC
NITRITE, URINE: NEGATIVE
OCCULT BLOOD: NEGATIVE
PH, URINE: 7 (ref 4.5–8)
SPECIFIC GRAVITY: 1.01 (ref 1–1.03)
URINE-COLOR: YELLOW
UROBILINOGEN,SEMI-QN: 0.2 MG/DL (ref 0–1.9)

## 2022-09-20 PROCEDURE — 81002 URINALYSIS NONAUTO W/O SCOPE: CPT | Performed by: OBSTETRICS & GYNECOLOGY

## 2022-09-20 PROCEDURE — 0500F INITIAL PRENATAL CARE VISIT: CPT | Performed by: OBSTETRICS & GYNECOLOGY

## 2022-09-20 PROCEDURE — 3079F DIAST BP 80-89 MM HG: CPT | Performed by: OBSTETRICS & GYNECOLOGY

## 2022-09-20 PROCEDURE — 3077F SYST BP >= 140 MM HG: CPT | Performed by: OBSTETRICS & GYNECOLOGY

## 2022-09-21 PROBLEM — O30.049 DICHORIONIC DIAMNIOTIC TWIN PREGNANCY: Status: ACTIVE | Noted: 2022-09-21

## 2022-09-21 PROBLEM — O30.049 DICHORIONIC DIAMNIOTIC TWIN PREGNANCY (HCC): Status: ACTIVE | Noted: 2022-09-21

## 2022-09-21 LAB
C TRACH DNA SPEC QL NAA+PROBE: NEGATIVE
N GONORRHOEA DNA SPEC QL NAA+PROBE: NEGATIVE
T VAGINALIS RRNA SPEC QL NAA+PROBE: NEGATIVE

## 2022-10-21 ENCOUNTER — TELEPHONE (OUTPATIENT)
Dept: PERINATAL CARE | Facility: HOSPITAL | Age: 22
End: 2022-10-21

## 2022-10-21 ENCOUNTER — ROUTINE PRENATAL (OUTPATIENT)
Dept: OBGYN CLINIC | Facility: CLINIC | Age: 22
End: 2022-10-21
Payer: MEDICAID

## 2022-10-21 ENCOUNTER — TELEPHONE (OUTPATIENT)
Dept: OBGYN CLINIC | Facility: CLINIC | Age: 22
End: 2022-10-21

## 2022-10-21 VITALS
HEART RATE: 94 BPM | SYSTOLIC BLOOD PRESSURE: 124 MMHG | BODY MASS INDEX: 24 KG/M2 | DIASTOLIC BLOOD PRESSURE: 76 MMHG | WEIGHT: 131 LBS

## 2022-10-21 DIAGNOSIS — Z34.92 ENCOUNTER FOR SUPERVISION OF NORMAL PREGNANCY IN SECOND TRIMESTER, UNSPECIFIED GRAVIDITY: Primary | ICD-10-CM

## 2022-10-21 DIAGNOSIS — O30.009 TWIN PREGNANCY, ANTEPARTUM, UNSPECIFIED MULTIPLE GESTATION TYPE: Primary | ICD-10-CM

## 2022-10-21 LAB
APPEARANCE: CLEAR
BILIRUBIN: NEGATIVE
GLUCOSE (URINE DIPSTICK): NEGATIVE MG/DL
KETONES (URINE DIPSTICK): NEGATIVE MG/DL
MULTISTIX LOT#: ABNORMAL NUMERIC
NITRITE, URINE: NEGATIVE
OCCULT BLOOD: NEGATIVE
PH, URINE: 6.5 (ref 4.5–8)
PROTEIN (URINE DIPSTICK): NEGATIVE MG/DL
SPECIFIC GRAVITY: 1.02 (ref 1–1.03)
URINE-COLOR: YELLOW
UROBILINOGEN,SEMI-QN: 0.2 MG/DL (ref 0–1.9)

## 2022-10-21 PROCEDURE — 81002 URINALYSIS NONAUTO W/O SCOPE: CPT | Performed by: OBSTETRICS & GYNECOLOGY

## 2022-10-21 PROCEDURE — 3074F SYST BP LT 130 MM HG: CPT | Performed by: OBSTETRICS & GYNECOLOGY

## 2022-10-21 PROCEDURE — 0502F SUBSEQUENT PRENATAL CARE: CPT | Performed by: OBSTETRICS & GYNECOLOGY

## 2022-10-21 PROCEDURE — 3078F DIAST BP <80 MM HG: CPT | Performed by: OBSTETRICS & GYNECOLOGY

## 2022-11-04 ENCOUNTER — TELEPHONE (OUTPATIENT)
Dept: PERINATAL CARE | Facility: HOSPITAL | Age: 22
End: 2022-11-04

## 2022-11-07 ENCOUNTER — HOSPITAL ENCOUNTER (EMERGENCY)
Facility: HOSPITAL | Age: 22
Discharge: HOME | End: 2022-11-07
Attending: EMERGENCY MEDICINE
Payer: MEDICAID

## 2022-11-07 VITALS
RESPIRATION RATE: 20 BRPM | DIASTOLIC BLOOD PRESSURE: 59 MMHG | HEART RATE: 106 BPM | SYSTOLIC BLOOD PRESSURE: 138 MMHG | WEIGHT: 131 LBS | HEIGHT: 62 IN | OXYGEN SATURATION: 100 % | TEMPERATURE: 98 F | BODY MASS INDEX: 24.11 KG/M2
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
## 2022-11-07 PROCEDURE — 99284 EMERGENCY DEPT VISIT MOD MDM: CPT

## 2022-11-08 ENCOUNTER — TELEPHONE (OUTPATIENT)
Dept: OBGYN CLINIC | Facility: CLINIC | Age: 22
End: 2022-11-08

## 2022-11-08 NOTE — TELEPHONE ENCOUNTER
----- Message from Marce Salomon sent at 10/21/2022  2:48 PM CDT -----  Regarding: MFM PRIOR AUTHORIZATION  Pt is scheduled on 11/28/22    For LEVEL 2/19679    DX TWINS    Need PA
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
## 2022-11-18 ENCOUNTER — ROUTINE PRENATAL (OUTPATIENT)
Dept: OBGYN CLINIC | Facility: CLINIC | Age: 22
End: 2022-11-18
Payer: MEDICAID

## 2022-11-18 VITALS
HEART RATE: 133 BPM | SYSTOLIC BLOOD PRESSURE: 141 MMHG | WEIGHT: 141.19 LBS | DIASTOLIC BLOOD PRESSURE: 79 MMHG | BODY MASS INDEX: 26 KG/M2

## 2022-11-18 DIAGNOSIS — Z34.92 ENCOUNTER FOR SUPERVISION OF NORMAL PREGNANCY IN SECOND TRIMESTER, UNSPECIFIED GRAVIDITY: Primary | ICD-10-CM

## 2022-11-18 LAB
APPEARANCE: CLEAR
BILIRUBIN: NEGATIVE
GLUCOSE (URINE DIPSTICK): NEGATIVE MG/DL
KETONES (URINE DIPSTICK): NEGATIVE MG/DL
LEUKOCYTES: NEGATIVE
MULTISTIX LOT#: NORMAL NUMERIC
NITRITE, URINE: NEGATIVE
OCCULT BLOOD: NEGATIVE
PH, URINE: 7.5 (ref 4.5–8)
SPECIFIC GRAVITY: 1.01 (ref 1–1.03)
URINE-COLOR: YELLOW
UROBILINOGEN,SEMI-QN: 0.2 MG/DL (ref 0–1.9)

## 2022-11-18 PROCEDURE — 81002 URINALYSIS NONAUTO W/O SCOPE: CPT | Performed by: OBSTETRICS & GYNECOLOGY

## 2022-11-18 PROCEDURE — 3077F SYST BP >= 140 MM HG: CPT | Performed by: OBSTETRICS & GYNECOLOGY

## 2022-11-18 PROCEDURE — 3078F DIAST BP <80 MM HG: CPT | Performed by: OBSTETRICS & GYNECOLOGY

## 2022-11-18 PROCEDURE — 0502F SUBSEQUENT PRENATAL CARE: CPT | Performed by: OBSTETRICS & GYNECOLOGY

## 2022-11-28 ENCOUNTER — HOSPITAL ENCOUNTER (OUTPATIENT)
Dept: PERINATAL CARE | Facility: HOSPITAL | Age: 22
Discharge: HOME | End: 2022-11-28
Attending: OBSTETRICS & GYNECOLOGY
Payer: MEDICAID

## 2022-11-28 ENCOUNTER — HOSPITAL ENCOUNTER (OUTPATIENT)
Dept: PERINATAL CARE | Facility: HOSPITAL | Age: 22
End: 2022-11-28
Attending: OBSTETRICS & GYNECOLOGY
Payer: MEDICAID

## 2022-11-28 VITALS
BODY MASS INDEX: 26 KG/M2 | HEART RATE: 88 BPM | DIASTOLIC BLOOD PRESSURE: 72 MMHG | SYSTOLIC BLOOD PRESSURE: 140 MMHG | WEIGHT: 141 LBS

## 2022-11-28 DIAGNOSIS — O30.042 DICHORIONIC DIAMNIOTIC TWIN PREGNANCY IN SECOND TRIMESTER: ICD-10-CM

## 2022-11-28 DIAGNOSIS — O30.042 DICHORIONIC DIAMNIOTIC TWIN PREGNANCY IN SECOND TRIMESTER: Primary | ICD-10-CM

## 2022-11-28 PROCEDURE — 76812 OB US DETAILED ADDL FETUS: CPT

## 2022-11-28 PROCEDURE — 76811 OB US DETAILED SNGL FETUS: CPT | Performed by: OBSTETRICS & GYNECOLOGY

## 2022-12-07 ENCOUNTER — TELEPHONE (OUTPATIENT)
Dept: OBGYN CLINIC | Facility: CLINIC | Age: 22
End: 2022-12-07

## 2022-12-07 NOTE — TELEPHONE ENCOUNTER
----- Message from Herman Rodriguez RN sent at 11/28/2022  9:30 AM CST -----  Regarding: PA for Twins growth  Hi  We need a PA for twins growth for attached pt   Thank you    Herman Rodriguez RN BSN  Maternal Fetal Medicine at [address] [place] [address]

## 2022-12-07 NOTE — TELEPHONE ENCOUNTER
Your case has been Approved. Provider Name: DR. Duarte Villegas Contact: Higinio Robert  Provider Address: [place] [address]  [place], [place] Phone Number: [phone]   Fax Number: [phone]  Patient Name: Nida Poole Patient Id: [id number]  [facility]  Site Name: [address] [place] [address] Site ID: [id number]  Site Address: [address] [place] [address]  [place], [place]      Primary Diagnosis Code: O30.049 Description: Twin pregnancy, dichorionic/diamniotic, unspecified trimester  Secondary Diagnosis Code:  Description:   Date of Service: Not provided    CPT Code: 36303 Description: Ob us, follow-up, per fetus  Authorization Number: [id number]  Review Date: 12/7/2022 2:25:10 PM  Expiration Date: 9/3/2023  Status: Your case has been Approved.

## 2022-12-16 ENCOUNTER — ROUTINE PRENATAL (OUTPATIENT)
Dept: OBGYN CLINIC | Facility: CLINIC | Age: 22
End: 2022-12-16
Payer: MEDICAID

## 2022-12-16 VITALS
WEIGHT: 146 LBS | DIASTOLIC BLOOD PRESSURE: 74 MMHG | SYSTOLIC BLOOD PRESSURE: 128 MMHG | HEART RATE: 112 BPM | BODY MASS INDEX: 27 KG/M2

## 2022-12-16 DIAGNOSIS — Z34.92 ENCOUNTER FOR SUPERVISION OF NORMAL PREGNANCY IN SECOND TRIMESTER, UNSPECIFIED GRAVIDITY: Primary | ICD-10-CM

## 2022-12-16 LAB
BILIRUBIN: NEGATIVE
GLUCOSE (URINE DIPSTICK): NEGATIVE MG/DL
KETONES (URINE DIPSTICK): NEGATIVE MG/DL
MULTISTIX EXPIRATION DATE: 1723 DATE
MULTISTIX LOT#: 4023 NUMERIC
NITRITE, URINE: NEGATIVE
OCCULT BLOOD: NEGATIVE
PH, URINE: 8 (ref 4.5–8)
PROTEIN (URINE DIPSTICK): NEGATIVE MG/DL
SPECIFIC GRAVITY: 1.01 (ref 1–1.03)
UROBILINOGEN,SEMI-QN: 0.2 MG/DL (ref 0–1.9)

## 2022-12-20 ENCOUNTER — LAB ENCOUNTER (OUTPATIENT)
Dept: LAB | Facility: HOSPITAL | Age: 22
End: 2022-12-20
Attending: OBSTETRICS & GYNECOLOGY
Payer: MEDICAID

## 2022-12-20 DIAGNOSIS — Z34.92 ENCOUNTER FOR SUPERVISION OF NORMAL PREGNANCY IN SECOND TRIMESTER, UNSPECIFIED GRAVIDITY: ICD-10-CM

## 2022-12-20 LAB
DEPRECATED RDW RBC AUTO: 44.8 FL (ref 35.1–46.3)
ERYTHROCYTE [DISTWIDTH] IN BLOOD BY AUTOMATED COUNT: 12.7 % (ref 11–15)
GLUCOSE 1H P GLC SERPL-MCNC: 178 MG/DL
HCT VFR BLD AUTO: 34.2 %
HGB BLD-MCNC: 11.7 G/DL
MCH RBC QN AUTO: 33.1 PG (ref 26–34)
MCHC RBC AUTO-ENTMCNC: 34.2 G/DL (ref 31–37)
MCV RBC AUTO: 96.9 FL
PLATELET # BLD AUTO: 198 10(3)UL (ref 150–450)
RBC # BLD AUTO: 3.53 X10(6)UL
WBC # BLD AUTO: 5.9 X10(3) UL (ref 4–11)

## 2022-12-20 PROCEDURE — 36415 COLL VENOUS BLD VENIPUNCTURE: CPT

## 2022-12-20 PROCEDURE — 82950 GLUCOSE TEST: CPT

## 2022-12-20 PROCEDURE — 85027 COMPLETE CBC AUTOMATED: CPT

## 2022-12-22 ENCOUNTER — TELEPHONE (OUTPATIENT)
Dept: OBGYN CLINIC | Facility: CLINIC | Age: 22
End: 2022-12-22

## 2022-12-22 DIAGNOSIS — R73.09 ELEVATED GLUCOSE TOLERANCE TEST: Primary | ICD-10-CM

## 2022-12-22 NOTE — TELEPHONE ENCOUNTER
----- Message from Kendrick Wisdom MD sent at 12/22/2022  1:36 PM CST -----  1 hr glucola 178.   Needs 3 hr GTT

## 2022-12-24 NOTE — TELEPHONE ENCOUNTER
Patient notified of results and recs for 3 hr gtt. Explained 3 hr test to pt in detail. Directed to fast 8-10 hours. Provided CS#. Directed pt to schedule in the next week or so. Patient verbalized understanding.

## 2023-01-02 ENCOUNTER — LABORATORY ENCOUNTER (OUTPATIENT)
Dept: LAB | Facility: HOSPITAL | Age: 23
End: 2023-01-02
Attending: OBSTETRICS & GYNECOLOGY
Payer: MEDICAID

## 2023-01-02 DIAGNOSIS — R73.09 ELEVATED GLUCOSE TOLERANCE TEST: ICD-10-CM

## 2023-01-02 LAB — GLUCOSE P FAST SERPL-MCNC: 75 MG/DL

## 2023-01-04 ENCOUNTER — TELEPHONE (OUTPATIENT)
Dept: OBGYN CLINIC | Facility: CLINIC | Age: 23
End: 2023-01-04

## 2023-01-04 DIAGNOSIS — R73.09 ELEVATED GLUCOSE TOLERANCE TEST: Primary | ICD-10-CM

## 2023-01-04 RX ORDER — ONDANSETRON 4 MG/1
4 TABLET, FILM COATED ORAL ONCE
Qty: 1 TABLET | Refills: 0 | Status: SHIPPED | OUTPATIENT
Start: 2023-01-04 | End: 2023-01-04

## 2023-01-04 NOTE — TELEPHONE ENCOUNTER
Pt with vomiting during 3hr gtt, directed to repeat 3 hr gtt. Offered pt zofran per protocol. Pt hesitant to take medication, but explained this is commonly prescribed in pregnancy and will help her keep glucola down. Pt agrees to plan. Rx sent. Reviewed need for appt, and needs to fast. Patient verbalized understanding.

## 2023-01-04 NOTE — TELEPHONE ENCOUNTER
Patient called in she states drank some glucose, 3 hour threw up medication want to know if she need to take again

## 2023-01-06 ENCOUNTER — LABORATORY ENCOUNTER (OUTPATIENT)
Dept: LAB | Facility: HOSPITAL | Age: 23
End: 2023-01-06
Attending: OBSTETRICS & GYNECOLOGY
Payer: MEDICAID

## 2023-01-06 DIAGNOSIS — R73.09 ELEVATED GLUCOSE TOLERANCE TEST: ICD-10-CM

## 2023-01-06 LAB
GLUCOSE 1H P GLC SERPL-MCNC: 165 MG/DL
GLUCOSE 2H P GLC SERPL-MCNC: 165 MG/DL
GLUCOSE 3H P GLC SERPL-MCNC: 119 MG/DL (ref 70–140)
GLUCOSE P FAST SERPL-MCNC: 78 MG/DL

## 2023-01-06 PROCEDURE — 36415 COLL VENOUS BLD VENIPUNCTURE: CPT

## 2023-01-06 PROCEDURE — 82952 GTT-ADDED SAMPLES: CPT

## 2023-01-06 PROCEDURE — 82951 GLUCOSE TOLERANCE TEST (GTT): CPT

## 2023-01-11 ENCOUNTER — ROUTINE PRENATAL (OUTPATIENT)
Dept: OBGYN CLINIC | Facility: CLINIC | Age: 23
End: 2023-01-11

## 2023-01-11 VITALS
SYSTOLIC BLOOD PRESSURE: 129 MMHG | HEART RATE: 105 BPM | WEIGHT: 149 LBS | BODY MASS INDEX: 27 KG/M2 | DIASTOLIC BLOOD PRESSURE: 73 MMHG

## 2023-01-11 DIAGNOSIS — Z34.92 ENCOUNTER FOR SUPERVISION OF NORMAL PREGNANCY IN SECOND TRIMESTER, UNSPECIFIED GRAVIDITY: Primary | ICD-10-CM

## 2023-01-11 PROCEDURE — 0502F SUBSEQUENT PRENATAL CARE: CPT | Performed by: OBSTETRICS & GYNECOLOGY

## 2023-01-11 PROCEDURE — 3078F DIAST BP <80 MM HG: CPT | Performed by: OBSTETRICS & GYNECOLOGY

## 2023-01-11 PROCEDURE — 81002 URINALYSIS NONAUTO W/O SCOPE: CPT | Performed by: OBSTETRICS & GYNECOLOGY

## 2023-01-11 PROCEDURE — 3074F SYST BP LT 130 MM HG: CPT | Performed by: OBSTETRICS & GYNECOLOGY

## 2023-01-25 ENCOUNTER — HOSPITAL ENCOUNTER (OUTPATIENT)
Dept: PERINATAL CARE | Facility: HOSPITAL | Age: 23
Discharge: HOME | End: 2023-01-25
Attending: OBSTETRICS & GYNECOLOGY
Payer: MEDICAID

## 2023-01-25 VITALS
SYSTOLIC BLOOD PRESSURE: 121 MMHG | HEART RATE: 128 BPM | DIASTOLIC BLOOD PRESSURE: 77 MMHG | WEIGHT: 153 LBS | BODY MASS INDEX: 28 KG/M2

## 2023-01-25 DIAGNOSIS — O30.043 DICHORIONIC DIAMNIOTIC TWIN PREGNANCY IN THIRD TRIMESTER: Primary | ICD-10-CM

## 2023-01-25 DIAGNOSIS — O30.043 DICHORIONIC DIAMNIOTIC TWIN PREGNANCY IN THIRD TRIMESTER: ICD-10-CM

## 2023-01-25 PROCEDURE — 76816 OB US FOLLOW-UP PER FETUS: CPT | Performed by: OBSTETRICS & GYNECOLOGY

## 2023-01-25 PROCEDURE — 76816 OB US FOLLOW-UP PER FETUS: CPT

## 2023-01-27 ENCOUNTER — TELEPHONE (OUTPATIENT)
Dept: OBGYN CLINIC | Facility: CLINIC | Age: 23
End: 2023-01-27

## 2023-01-27 NOTE — TELEPHONE ENCOUNTER
----- Message from Lucian Doan sent at 1/25/2023 11:38 AM CST -----  Regarding: M AUTHORIZATION  Patient is scheduled on  2-22-23 & 3-22 23  For  GROWTH/BPP    [id number]  DX   TWINS  Needs PA

## 2023-01-27 NOTE — TELEPHONE ENCOUNTER
Your case has been sent to Medical Review. Provider Name: DR. Slava Ruelas Contact: kemal  Provider Address: [address]  [place], [place] Phone Number: [phone]   Fax Number: [phone]  Patient Name: Jean Pierre Fleming Patient Id: [id number]  Insurance Carrier: [facility]  Site Name: [address] [place] [address] Site ID: [id number]  Site Address: [address] [place] [address]  [place], [place]      Primary Diagnosis Code: O30.049 Description: Twin pregnancy, dichorionic/diamniotic, unspecified trimester  Secondary Diagnosis Code:  Description:   Date of Service: Not provided    CPT Code: 53422 Description: Ob us, follow-up, per fetus  Case Number: [id number]  Review Date: 1/27/2023 10:55:31 AM  Expiration Date: N/A  Status: Your case has been sent to Medical Review

## 2023-01-30 ENCOUNTER — TELEPHONE (OUTPATIENT)
Dept: OBGYN CLINIC | Facility: CLINIC | Age: 23
End: 2023-01-30

## 2023-01-30 ENCOUNTER — HOSPITAL ENCOUNTER (OUTPATIENT)
Facility: HOSPITAL | Age: 23
Discharge: ANOTHER HOSPITAL | End: 2023-01-30
Attending: OBSTETRICS & GYNECOLOGY | Admitting: OBSTETRICS & GYNECOLOGY
Payer: MEDICAID

## 2023-01-30 ENCOUNTER — HOSPITAL ENCOUNTER (OUTPATIENT)
Dept: PERINATAL CARE | Facility: HOSPITAL | Age: 23
Discharge: HOME | End: 2023-01-30
Attending: OBSTETRICS & GYNECOLOGY
Payer: MEDICAID

## 2023-01-30 ENCOUNTER — HOSPITAL ENCOUNTER (INPATIENT)
Facility: HOSPITAL | Age: 23
LOS: 14 days | Discharge: HOME | End: 2023-02-13
Attending: STUDENT IN AN ORGANIZED HEALTH CARE EDUCATION/TRAINING PROGRAM | Admitting: OBSTETRICS & GYNECOLOGY
Payer: MEDICAID

## 2023-01-30 VITALS
DIASTOLIC BLOOD PRESSURE: 67 MMHG | HEART RATE: 96 BPM | SYSTOLIC BLOOD PRESSURE: 126 MMHG | TEMPERATURE: 98 F | RESPIRATION RATE: 16 BRPM | OXYGEN SATURATION: 100 %

## 2023-01-30 DIAGNOSIS — O30.043 DICHORIONIC DIAMNIOTIC TWIN PREGNANCY IN THIRD TRIMESTER: ICD-10-CM

## 2023-01-30 DIAGNOSIS — O42.919 PRETERM PREMATURE RUPTURE OF MEMBRANES (PPROM) WITH UNKNOWN ONSET OF LABOR: ICD-10-CM

## 2023-01-30 PROBLEM — Z34.90 PREGNANCY: Status: ACTIVE | Noted: 2023-01-30

## 2023-01-30 PROBLEM — Z34.90 PREGNANCY (HCC): Status: ACTIVE | Noted: 2023-01-30

## 2023-01-30 LAB
AMPHET UR QL SCN: NEGATIVE
ANTIBODY SCREEN: NEGATIVE
BARBITURATES UR QL SCN: NEGATIVE
BASOPHILS # BLD AUTO: 0.03 X10(3) UL (ref 0–0.2)
BASOPHILS NFR BLD AUTO: 0.3 %
BENZODIAZ UR QL SCN: NEGATIVE
BILIRUB UR QL: NEGATIVE
CANNABINOIDS UR QL SCN: NEGATIVE
CLARITY UR: CLEAR
COCAINE UR QL: NEGATIVE
COLOR UR: YELLOW
CREAT UR-SCNC: 70.9 MG/DL
DEPRECATED RDW RBC AUTO: 42.5 FL (ref 35.1–46.3)
EOSINOPHIL # BLD AUTO: 0.08 X10(3) UL (ref 0–0.7)
EOSINOPHIL NFR BLD AUTO: 0.9 %
ERYTHROCYTE [DISTWIDTH] IN BLOOD BY AUTOMATED COUNT: 12.1 % (ref 11–15)
GLUCOSE UR-MCNC: NEGATIVE MG/DL
HCT VFR BLD AUTO: 33.9 %
HGB BLD-MCNC: 11.4 G/DL
HIV 1+2 AB+HIV1 P24 AG SERPL QL IA: NONREACTIVE
IMM GRANULOCYTES # BLD AUTO: 0.03 X10(3) UL (ref 0–1)
IMM GRANULOCYTES NFR BLD: 0.3 %
KETONES UR-MCNC: 15 MG/DL
LEUKOCYTE ESTERASE UR QL STRIP.AUTO: NEGATIVE
LYMPHOCYTES # BLD AUTO: 2 X10(3) UL (ref 1–4)
LYMPHOCYTES NFR BLD AUTO: 21.3 %
MCH RBC QN AUTO: 32 PG (ref 26–34)
MCHC RBC AUTO-ENTMCNC: 33.6 G/DL (ref 31–37)
MCV RBC AUTO: 95.2 FL
MDMA UR QL SCN: NEGATIVE
METHADONE UR QL SCN: NEGATIVE
MONOCYTES # BLD AUTO: 0.61 X10(3) UL (ref 0.1–1)
MONOCYTES NFR BLD AUTO: 6.5 %
NEUTROPHILS # BLD AUTO: 6.66 X10 (3) UL (ref 1.5–7.7)
NEUTROPHILS # BLD AUTO: 6.66 X10(3) UL (ref 1.5–7.7)
NEUTROPHILS NFR BLD AUTO: 70.7 %
NITRITE UR QL STRIP.AUTO: NEGATIVE
OPIATES UR QL SCN: NEGATIVE
OXYCODONE UR QL SCN: NEGATIVE
PCP UR QL SCN: NEGATIVE
PH UR: 5.5 [PH] (ref 5–8)
PLATELET # BLD AUTO: 218 10(3)UL (ref 150–450)
PROT UR-MCNC: NEGATIVE MG/DL
RBC # BLD AUTO: 3.56 X10(6)UL
RH BLOOD TYPE: POSITIVE
RUPTURE OF MEMBRANE (ROM): POSITIVE
SARS-COV-2 RNA RESP QL NAA+PROBE: NOT DETECTED
SP GR UR STRIP: 1.01 (ref 1–1.03)
UROBILINOGEN UR STRIP-ACNC: 0.2
WBC # BLD AUTO: 9.4 X10(3) UL (ref 4–11)

## 2023-01-30 PROCEDURE — 99234 HOSP IP/OBS SM DT SF/LOW 45: CPT | Performed by: OBSTETRICS & GYNECOLOGY

## 2023-01-30 PROCEDURE — 99223 1ST HOSP IP/OBS HIGH 75: CPT | Performed by: STUDENT IN AN ORGANIZED HEALTH CARE EDUCATION/TRAINING PROGRAM

## 2023-01-30 PROCEDURE — 76815 OB US LIMITED FETUS(S): CPT | Performed by: OBSTETRICS & GYNECOLOGY

## 2023-01-30 RX ORDER — ZOLPIDEM TARTRATE 5 MG/1
5 TABLET ORAL NIGHTLY PRN
Status: DISCONTINUED | OUTPATIENT
Start: 2023-01-30 | End: 2023-02-11

## 2023-01-30 RX ORDER — DOCUSATE SODIUM 100 MG/1
100 CAPSULE, LIQUID FILLED ORAL 2 TIMES DAILY
Status: DISCONTINUED | OUTPATIENT
Start: 2023-01-30 | End: 2023-02-11

## 2023-01-30 RX ORDER — CHOLECALCIFEROL (VITAMIN D3) 25 MCG
1 TABLET,CHEWABLE ORAL DAILY
Status: DISCONTINUED | OUTPATIENT
Start: 2023-01-30 | End: 2023-02-11

## 2023-01-30 RX ORDER — CALCIUM CARBONATE 200(500)MG
1000 TABLET,CHEWABLE ORAL
Status: DISCONTINUED | OUTPATIENT
Start: 2023-01-30 | End: 2023-01-30

## 2023-01-30 RX ORDER — ONDANSETRON 2 MG/ML
4 INJECTION INTRAMUSCULAR; INTRAVENOUS EVERY 6 HOURS PRN
Status: DISCONTINUED | OUTPATIENT
Start: 2023-01-30 | End: 2023-02-11

## 2023-01-30 RX ORDER — ACETAMINOPHEN 500 MG
1000 TABLET ORAL EVERY 6 HOURS PRN
Status: DISCONTINUED | OUTPATIENT
Start: 2023-01-30 | End: 2023-02-11

## 2023-01-30 RX ORDER — CALCIUM CARBONATE 200(500)MG
1000 TABLET,CHEWABLE ORAL
Status: DISCONTINUED | OUTPATIENT
Start: 2023-01-30 | End: 2023-02-11

## 2023-01-30 RX ORDER — AZITHROMYCIN 250 MG/1
250 TABLET, FILM COATED ORAL DAILY
Status: DISCONTINUED | OUTPATIENT
Start: 2023-01-31 | End: 2023-01-30

## 2023-01-30 RX ORDER — SODIUM CHLORIDE, SODIUM LACTATE, POTASSIUM CHLORIDE, CALCIUM CHLORIDE 600; 310; 30; 20 MG/100ML; MG/100ML; MG/100ML; MG/100ML
INJECTION, SOLUTION INTRAVENOUS CONTINUOUS
Status: DISCONTINUED | OUTPATIENT
Start: 2023-01-30 | End: 2023-01-30

## 2023-01-30 RX ORDER — BETAMETHASONE SODIUM PHOSPHATE AND BETAMETHASONE ACETATE 3; 3 MG/ML; MG/ML
12 INJECTION, SUSPENSION INTRA-ARTICULAR; INTRALESIONAL; INTRAMUSCULAR; SOFT TISSUE EVERY 24 HOURS
Status: DISCONTINUED | OUTPATIENT
Start: 2023-01-30 | End: 2023-01-30

## 2023-01-30 RX ORDER — DOCUSATE SODIUM 100 MG/1
100 CAPSULE, LIQUID FILLED ORAL 2 TIMES DAILY
Status: DISCONTINUED | OUTPATIENT
Start: 2023-01-30 | End: 2023-01-30

## 2023-01-30 RX ORDER — BETAMETHASONE SODIUM PHOSPHATE AND BETAMETHASONE ACETATE 3; 3 MG/ML; MG/ML
12 INJECTION, SUSPENSION INTRA-ARTICULAR; INTRALESIONAL; INTRAMUSCULAR; SOFT TISSUE EVERY 24 HOURS
Status: COMPLETED | OUTPATIENT
Start: 2023-01-31 | End: 2023-01-31

## 2023-01-30 RX ORDER — SODIUM CHLORIDE, SODIUM LACTATE, POTASSIUM CHLORIDE, CALCIUM CHLORIDE 600; 310; 30; 20 MG/100ML; MG/100ML; MG/100ML; MG/100ML
INJECTION, SOLUTION INTRAVENOUS CONTINUOUS
Status: ACTIVE | OUTPATIENT
Start: 2023-01-30 | End: 2023-01-31

## 2023-01-30 RX ORDER — ACETAMINOPHEN 500 MG
500 TABLET ORAL EVERY 6 HOURS PRN
Status: DISCONTINUED | OUTPATIENT
Start: 2023-01-30 | End: 2023-01-30

## 2023-01-30 RX ORDER — AMOXICILLIN 500 MG/1
500 CAPSULE ORAL EVERY 8 HOURS SCHEDULED
Status: DISCONTINUED | OUTPATIENT
Start: 2023-02-01 | End: 2023-01-30

## 2023-01-30 RX ORDER — DOCUSATE SODIUM 100 MG/1
100 CAPSULE, LIQUID FILLED ORAL 2 TIMES DAILY PRN
Status: DISCONTINUED | OUTPATIENT
Start: 2023-01-30 | End: 2023-01-30

## 2023-01-30 RX ORDER — ZOLPIDEM TARTRATE 5 MG/1
5 TABLET ORAL NIGHTLY PRN
Status: DISCONTINUED | OUTPATIENT
Start: 2023-01-30 | End: 2023-01-30

## 2023-01-30 RX ORDER — AMOXICILLIN 500 MG/1
500 CAPSULE ORAL EVERY 8 HOURS SCHEDULED
Status: COMPLETED | OUTPATIENT
Start: 2023-02-01 | End: 2023-02-05

## 2023-01-30 RX ORDER — MAGNESIUM SULFATE HEPTAHYDRATE 40 MG/ML
2 INJECTION, SOLUTION INTRAVENOUS ONCE
Status: DISCONTINUED | OUTPATIENT
Start: 2023-01-30 | End: 2023-02-06

## 2023-01-30 RX ORDER — AZITHROMYCIN 250 MG/1
250 TABLET, FILM COATED ORAL DAILY
Status: COMPLETED | OUTPATIENT
Start: 2023-01-31 | End: 2023-02-04

## 2023-01-30 RX ORDER — CALCIUM GLUCONATE 94 MG/ML
1 INJECTION, SOLUTION INTRAVENOUS ONCE AS NEEDED
Status: DISCONTINUED | OUTPATIENT
Start: 2023-01-30 | End: 2023-01-30

## 2023-01-30 RX ORDER — ACETAMINOPHEN 500 MG
500 TABLET ORAL EVERY 6 HOURS PRN
Status: DISCONTINUED | OUTPATIENT
Start: 2023-01-30 | End: 2023-02-11

## 2023-01-30 RX ORDER — ACETAMINOPHEN 500 MG
1000 TABLET ORAL EVERY 6 HOURS PRN
Status: DISCONTINUED | OUTPATIENT
Start: 2023-01-30 | End: 2023-01-30

## 2023-01-30 NOTE — PROGRESS NOTES
Pt transferred off unit via THE [facility] transfer team, accompanied by security in stable condition.

## 2023-01-30 NOTE — TELEPHONE ENCOUNTER
Pt heading over to hospital.  She believes her water has broke and doesn't know where to go in the hospital.    Pls advise

## 2023-01-30 NOTE — TELEPHONE ENCOUNTER
Authorization Number: [id number]  Case Number: [id number]     Patient Name: Kurt Lovelace  : 3/6/2000  Status: Approved  P2P Status:   Approval Date: 2023 12:00:00 AM  Service Code: 54109  Service Description: Ob us, follow-up, per fetus  Site Name: [address] [place] [address]  Start Date:   Expiration Date: 10/24/2023  Date Last Updated: 2023 4:29:17 PM  Correspondence:    Procedures  Procedure Description Qty Requested Qty Approved Modifier(s)  71286   Ultrasound, a special kind of picture of your baby 4 4   11707   testing of your baby 4 4

## 2023-01-30 NOTE — PROGRESS NOTES
Pt is a 20year old female admitted to TR5/TR5-A. Patient presents with:  R/o Rom: Leaking since 845 this moring, larger gush at 1100, reports +FM x2, denies VB     Pt is  29w1d intra-uterine pregnancy. History obtained, consents signed. Oriented to room, staff, and plan of care.

## 2023-01-30 NOTE — PROGRESS NOTES
Imaging note    Dichorionic twin pregnancy    Twin A - IUP in cephalic right presentation. Placenta is anterior, high. Cardiac activity is present, 143 bpm   MVP is 4.4 cm. Twin B - IUP in cephalic left presentation. Placenta is posterior, high. Cardiac activity is present, 141 bpm  MVP is 4.5 cm. Please imaging tab for full report. See consult note for recommendations.

## 2023-01-30 NOTE — TELEPHONE ENCOUNTER
29w1d. Pt states she noted leaking around 825 am, thought it was discharge. Pt states it had been a constant trickle, then around 11 am she noted fluid \"like I peed myself\" and it has been a constant flow since then. Pt is already on her way to [facility], states less then 10 mins away. Pt states +FM, denies any ctxs or VB. Informed to continue to [facility], pt states understanding. Nanda Hernandez RN from [facility] notified that pt is coming for r/o SROM and is Di/Di twins. Di/Di twins   previous     To [facility] on-call and [address] coming on-call, fyi. Thank you.

## 2023-01-30 NOTE — PROGRESS NOTES
Pt is a 20year old female admitted to 117/117-A. Patient presents with:  Prom  Rupture Of Membranes     Pt is  29w1d intra-uterine pregnancy. History obtained, consents signed. Oriented to room, staff, and plan of care. Calling MFM and Ob new orders.

## 2023-01-31 PROBLEM — O10.919 CHRONIC HYPERTENSION AFFECTING PREGNANCY: Status: ACTIVE | Noted: 2022-09-20

## 2023-01-31 PROBLEM — O99.013 ANEMIA AFFECTING PREGNANCY IN THIRD TRIMESTER (HCC): Status: ACTIVE | Noted: 2023-01-31

## 2023-01-31 PROBLEM — O16.1 HYPERTENSION AFFECTING PREGNANCY IN FIRST TRIMESTER: Status: ACTIVE | Noted: 2022-09-20

## 2023-01-31 PROBLEM — O16.1 HYPERTENSION AFFECTING PREGNANCY IN FIRST TRIMESTER (HCC): Status: ACTIVE | Noted: 2022-09-20

## 2023-01-31 PROBLEM — O10.919 CHRONIC HYPERTENSION AFFECTING PREGNANCY (HCC): Status: ACTIVE | Noted: 2022-09-20

## 2023-01-31 PROBLEM — O99.013 ANEMIA AFFECTING PREGNANCY IN THIRD TRIMESTER: Status: ACTIVE | Noted: 2023-01-31

## 2023-01-31 LAB
ALBUMIN SERPL-MCNC: 2.7 G/DL (ref 3.4–5)
ALBUMIN/GLOB SERPL: 0.7 {RATIO} (ref 1–2)
ALP LIVER SERPL-CCNC: 159 U/L
ALT SERPL-CCNC: 11 U/L
ANION GAP SERPL CALC-SCNC: 2 MMOL/L (ref 0–18)
AST SERPL-CCNC: 9 U/L (ref 15–37)
BILIRUB SERPL-MCNC: 0.2 MG/DL (ref 0.1–2)
BUN BLD-MCNC: 4 MG/DL (ref 7–18)
C TRACH DNA SPEC QL NAA+PROBE: NEGATIVE
CALCIUM BLD-MCNC: 8.5 MG/DL (ref 8.5–10.1)
CHLORIDE SERPL-SCNC: 113 MMOL/L (ref 98–112)
CO2 SERPL-SCNC: 23 MMOL/L (ref 21–32)
CREAT BLD-MCNC: 0.68 MG/DL
DEPRECATED HBV CORE AB SER IA-ACNC: 12.6 NG/ML
GFR SERPLBLD BASED ON 1.73 SQ M-ARVRAT: 125 ML/MIN/1.73M2 (ref 60–?)
GLOBULIN PLAS-MCNC: 4 G/DL (ref 2.8–4.4)
GLUCOSE BLD-MCNC: 165 MG/DL (ref 70–99)
GROUP B STREP BY PCR FOR PCR OVT: NEGATIVE
N GONORRHOEA DNA SPEC QL NAA+PROBE: NEGATIVE
OSMOLALITY SERPL CALC.SUM OF ELEC: 287 MOSM/KG (ref 275–295)
POTASSIUM SERPL-SCNC: 4.8 MMOL/L (ref 3.5–5.1)
PROT SERPL-MCNC: 6.7 G/DL (ref 6.4–8.2)
SODIUM SERPL-SCNC: 138 MMOL/L (ref 136–145)
URATE SERPL-MCNC: 2.7 MG/DL

## 2023-01-31 PROCEDURE — 99232 SBSQ HOSP IP/OBS MODERATE 35: CPT | Performed by: OBSTETRICS & GYNECOLOGY

## 2023-01-31 RX ORDER — MELATONIN
325
Status: DISCONTINUED | OUTPATIENT
Start: 2023-01-31 | End: 2023-02-11

## 2023-01-31 NOTE — PLAN OF CARE
Problem: ANTEPARTUM/LABOR and DELIVERY  Goal: Maintain pregnancy as long as maternal and/or fetal condition is stable  Description: INTERVENTIONS:  - Maternal surveillance  - Fetal surveillance  - Monitor uterine activity  - Medications as ordered  - Bedrest  Outcome: Progressing  Goal: Anxiety is at manageable level  Description: INTERVENTIONS:  - [place] patient to unit/surroundings  - Establish a trusting relationship with patient  - Discuss possible complications or alterations in birth plan  - Explain treatment plan  - Explain testing/procedures prior to initiation  - Encourage participation in care  - Encourage verbalization of concerns/fears  - Identify coping mechanisms  - Administer/offer alternative therapies (Aroma therapy, distraction, guided imagery, massage, music therapy, therapeutic touch)  - Manage patient's environment (Avoid overstimulation of patient)  Outcome: Progressing  Goal: Demonstrates ability to cope with hospitalization/illness  Description: INTERVENTIONS:  - Encourage verbalization of feelings/concerns/expectations  - Provide quiet environment  - Assist patient to identify own strengths and abilities  - Encourage patient to set small goals for self  - Encourage participation in diversional activity  - Reinforce positive adaptation of new coping behaviors  - Include patient/family/caregiver in decisions  Outcome: Progressing     Problem: Patient/Family Goals  Goal: Patient/Family Long Term Goal  Description: Patient's Long Term Goal: stay pregnant    Interventions:  -   - See additional Care Plan goals for specific interventions  Outcome: Progressing  Goal: Patient/Family Short Term Goal  Description: Patient's Short Term Goal: minimize contractions    Interventions:   -   - See additional Care Plan goals for specific interventions  Outcome: Progressing

## 2023-01-31 NOTE — PLAN OF CARE
Problem: ANTEPARTUM/LABOR and DELIVERY  Goal: Maintain pregnancy as long as maternal and/or fetal condition is stable  Description: INTERVENTIONS:  - Maternal surveillance  - Fetal surveillance  - Monitor uterine activity  - Medications as ordered  - Bedrest  Outcome: Progressing  Goal: Anxiety is at manageable level  Description: INTERVENTIONS:  - [place] patient to unit/surroundings  - Establish a trusting relationship with patient  - Discuss possible complications or alterations in birth plan  - Explain treatment plan  - Explain testing/procedures prior to initiation  - Encourage participation in care  - Encourage verbalization of concerns/fears  - Identify coping mechanisms  - Administer/offer alternative therapies (Aroma therapy, distraction, guided imagery, massage, music therapy, therapeutic touch)  - Manage patient's environment (Avoid overstimulation of patient)  Outcome: Progressing  Goal: Demonstrates ability to cope with hospitalization/illness  Description: INTERVENTIONS:  - Encourage verbalization of feelings/concerns/expectations  - Provide quiet environment  - Assist patient to identify own strengths and abilities  - Encourage patient to set small goals for self  - Encourage participation in diversional activity  - Reinforce positive adaptation of new coping behaviors  - Include patient/family/caregiver in decisions  Outcome: Progressing     Problem: Patient/Family Goals  Goal: Patient/Family Long Term Goal  Description: Patient's Long Term Goal: stay pregnant    Interventions:  - efm monitoring, meds, communication with healthcare providers  - See additional Care Plan goals for specific interventions  Outcome: Progressing  Goal: Patient/Family Short Term Goal  Description: Patient's Short Term Goal: protect from infections.     Interventions:   - Meds (po &IV), clean linen, daily care bid, check temp @2 hours  - See additional Care Plan goals for specific interventions  Outcome: Progressing

## 2023-01-31 NOTE — PLAN OF CARE
Problem: ANTEPARTUM/LABOR and DELIVERY  Goal: Maintain pregnancy as long as maternal and/or fetal condition is stable  Description: INTERVENTIONS:  - Maternal surveillance  - Fetal surveillance  - Monitor uterine activity  - Medications as ordered  - Bedrest  Outcome: Progressing  Goal: Anxiety is at manageable level  Description: INTERVENTIONS:  - [place] patient to unit/surroundings  - Establish a trusting relationship with patient  - Discuss possible complications or alterations in birth plan  - Explain treatment plan  - Explain testing/procedures prior to initiation  - Encourage participation in care  - Encourage verbalization of concerns/fears  - Identify coping mechanisms  - Administer/offer alternative therapies (Aroma therapy, distraction, guided imagery, massage, music therapy, therapeutic touch)  - Manage patient's environment (Avoid overstimulation of patient)  Outcome: Progressing  Goal: Demonstrates ability to cope with hospitalization/illness  Description: INTERVENTIONS:  - Encourage verbalization of feelings/concerns/expectations  - Provide quiet environment  - Assist patient to identify own strengths and abilities  - Encourage patient to set small goals for self  - Encourage participation in diversional activity  - Reinforce positive adaptation of new coping behaviors  - Include patient/family/caregiver in decisions  Outcome: Progressing     Problem: Patient/Family Goals  Goal: Patient/Family Long Term Goal  Description: Patient's Long Term Goal: to maintain pregnancy    Interventions:  - See additional Care Plan goals for specific interventions  Outcome: Progressing  Goal: Patient/Family Short Term Goal  Description: Patient's Short Term Goal: diminished fluid leakage    - See additional Care Plan goals for specific interventions  Outcome: Progressing

## 2023-01-31 NOTE — CM/SW NOTE
Team rounds done on patient. Team reviewed patient plan of care and possible discharge needs. Team present: Young Peres, RN Case Manager and RN caring for patient.

## 2023-01-31 NOTE — PROGRESS NOTES
Report received from [facility] and [address] [place] [address]. Pt denies any complaints, +FM, denies ctx or bleeding. POC discussed with pt, pt denies questions. Call light within reach.

## 2023-02-01 LAB
CREAT UR-SCNC: 42.5 MG/DL
PROT UR-MCNC: 8.5 MG/DL
PROT/CREAT UR-RTO: 0.2

## 2023-02-01 PROCEDURE — 99231 SBSQ HOSP IP/OBS SF/LOW 25: CPT | Performed by: OBSTETRICS & GYNECOLOGY

## 2023-02-01 NOTE — PLAN OF CARE
Problem: ANTEPARTUM/LABOR and DELIVERY  Goal: Maintain pregnancy as long as maternal and/or fetal condition is stable  Description: INTERVENTIONS:  - Maternal surveillance  - Fetal surveillance  - Monitor uterine activity  - Medications as ordered  - Bedrest  Outcome: Progressing  Goal: Anxiety is at manageable level  Description: INTERVENTIONS:  - [place] patient to unit/surroundings  - Establish a trusting relationship with patient  - Discuss possible complications or alterations in birth plan  - Explain treatment plan  - Explain testing/procedures prior to initiation  - Encourage participation in care  - Encourage verbalization of concerns/fears  - Identify coping mechanisms  - Administer/offer alternative therapies (Aroma therapy, distraction, guided imagery, massage, music therapy, therapeutic touch)  - Manage patient's environment (Avoid overstimulation of patient)  Outcome: Progressing  Goal: Demonstrates ability to cope with hospitalization/illness  Description: INTERVENTIONS:  - Encourage verbalization of feelings/concerns/expectations  - Provide quiet environment  - Assist patient to identify own strengths and abilities  - Encourage patient to set small goals for self  - Encourage participation in diversional activity  - Reinforce positive adaptation of new coping behaviors  - Include patient/family/caregiver in decisions  Outcome: Progressing     Problem: Patient/Family Goals  Goal: Patient/Family Long Term Goal  Description: Patient's Long Term Goal: patient has uncomplicated delivery    Interventions:  - communicate POC  - See additional Care Plan goals for specific interventions  Outcome: Progressing  Goal: Patient/Family Short Term Goal  Description: Patient's Short Term Goal: patient will maintain afebrile    Interventions:   - communicate POC  - See additional Care Plan goals for specific interventions  Outcome: Progressing

## 2023-02-01 NOTE — PLAN OF CARE
Problem: ANTEPARTUM/LABOR and DELIVERY  Goal: Maintain pregnancy as long as maternal and/or fetal condition is stable  Description: INTERVENTIONS:  - Maternal surveillance  - Fetal surveillance  - Monitor uterine activity  - Medications as ordered  - Bedrest  1/31/2023 2117 by Margo Gomez RN  Outcome: Progressing  1/31/2023 2116 by Margo Gomez RN  Outcome: Progressing  Goal: Anxiety is at manageable level  Description: INTERVENTIONS:  - [place] patient to unit/surroundings  - Establish a trusting relationship with patient  - Discuss possible complications or alterations in birth plan  - Explain treatment plan  - Explain testing/procedures prior to initiation  - Encourage participation in care  - Encourage verbalization of concerns/fears  - Identify coping mechanisms  - Administer/offer alternative therapies (Aroma therapy, distraction, guided imagery, massage, music therapy, therapeutic touch)  - Manage patient's environment (Avoid overstimulation of patient)  1/31/2023 2117 by Margo Gomez RN  Outcome: Progressing  1/31/2023 2116 by Margo Gomez RN  Outcome: Progressing  Goal: Demonstrates ability to cope with hospitalization/illness  Description: INTERVENTIONS:  - Encourage verbalization of feelings/concerns/expectations  - Provide quiet environment  - Assist patient to identify own strengths and abilities  - Encourage patient to set small goals for self  - Encourage participation in diversional activity  - Reinforce positive adaptation of new coping behaviors  - Include patient/family/caregiver in decisions  1/31/2023 2117 by Margo Gomez RN  Outcome: Progressing  1/31/2023 2116 by Margo Gomez RN  Outcome: Progressing     Problem: Patient/Family Goals  Goal: Patient/Family Long Term Goal  Description: Patient's Long Term Goal: patient has uncomplicated delivery    Interventions:  - communicate POC  - See additional Care Plan goals for specific interventions  1/31/2023 2117 by Susannah Menon [place]  Outcome: Progressing  1/31/2023 2116 by Susannah Menon RN  Outcome: Progressing  Goal: Patient/Family Short Term Goal  Description: Patient's Short Term Goal: patient will maintain afebrile    Interventions:   - communicate POC  - See additional Care Plan goals for specific interventions  1/31/2023 2117 by Susannah Menon RN  Outcome: Progressing  1/31/2023 2116 by Susannah Menon RN  Outcome: Progressing

## 2023-02-01 NOTE — PROGRESS NOTES
Patient seen and assessed. IV flushed and working. Denies HA, blurry vision, SOB, CP, nausea. Denies feeling contractions. Scant LOF, no odor, clear. Afebrile. All questions answered and POC reviewed.

## 2023-02-02 LAB
ANTIBODY SCREEN: NEGATIVE
RH BLOOD TYPE: POSITIVE

## 2023-02-02 PROCEDURE — 99233 SBSQ HOSP IP/OBS HIGH 50: CPT | Performed by: STUDENT IN AN ORGANIZED HEALTH CARE EDUCATION/TRAINING PROGRAM

## 2023-02-02 NOTE — PLAN OF CARE
Problem: ANTEPARTUM/LABOR and DELIVERY  Goal: Maintain pregnancy as long as maternal and/or fetal condition is stable  Description: INTERVENTIONS:  - Maternal surveillance  - Fetal surveillance  - Monitor uterine activity  - Medications as ordered  - Bedrest  Outcome: Progressing  Goal: Anxiety is at manageable level  Description: INTERVENTIONS:  - [place] patient to unit/surroundings  - Establish a trusting relationship with patient  - Discuss possible complications or alterations in birth plan  - Explain treatment plan  - Explain testing/procedures prior to initiation  - Encourage participation in care  - Encourage verbalization of concerns/fears  - Identify coping mechanisms  - Administer/offer alternative therapies (Aroma therapy, distraction, guided imagery, massage, music therapy, therapeutic touch)  - Manage patient's environment (Avoid overstimulation of patient)  Outcome: Progressing  Goal: Demonstrates ability to cope with hospitalization/illness  Description: INTERVENTIONS:  - Encourage verbalization of feelings/concerns/expectations  - Provide quiet environment  - Assist patient to identify own strengths and abilities  - Encourage patient to set small goals for self  - Encourage participation in diversional activity  - Reinforce positive adaptation of new coping behaviors  - Include patient/family/caregiver in decisions  Outcome: Progressing     Problem: Patient/Family Goals  Goal: Patient/Family Long Term Goal  Description: Patient's Long Term Goal: patient has uncomplicated delivery    Interventions:  - communicate POC  - See additional Care Plan goals for specific interventions  Outcome: Progressing  Goal: Patient/Family Short Term Goal  Description: Patient's Short Term Goal: patient will maintain afebrile    Interventions:   - communicate POC  - See additional Care Plan goals for specific interventions  Outcome: Progressing

## 2023-02-02 NOTE — CM/SW NOTE
02/02/23 1100   CM/SW Referral Data   Referral Source Nurse   Informant Patient     SW met with patient due to long anticipated antepartum stay. Patient presented with cheerful affect. Patient reports she lives in [facility] with her partner and 1year old daughter. Patient currently pregnant with twin boys, PPROM. Plan for patient to be in hospital until delivery. Patient reports \"feeling ok\", just missing her daughter. SW provided support and normalization. Patient reports strong support system, including her partner, family and in-laws. Patient reports no further immediate needs at this time. SW to continue to follow.      Sahil Royal LCSW  /Discharge Planner

## 2023-02-02 NOTE — PLAN OF CARE
Problem: ANTEPARTUM/LABOR and DELIVERY  Goal: Maintain pregnancy as long as maternal and/or fetal condition is stable  Description: INTERVENTIONS:  - Maternal surveillance  - Fetal surveillance  - Monitor uterine activity  - Medications as ordered  - Bedrest  Outcome: Progressing  Goal: Anxiety is at manageable level  Description: INTERVENTIONS:  - [place] patient to unit/surroundings  - Establish a trusting relationship with patient  - Discuss possible complications or alterations in birth plan  - Explain treatment plan  - Explain testing/procedures prior to initiation  - Encourage participation in care  - Encourage verbalization of concerns/fears  - Identify coping mechanisms  - Administer/offer alternative therapies (Aroma therapy, distraction, guided imagery, massage, music therapy, therapeutic touch)  - Manage patient's environment (Avoid overstimulation of patient)  Outcome: Progressing  Goal: Demonstrates ability to cope with hospitalization/illness  Description: INTERVENTIONS:  - Encourage verbalization of feelings/concerns/expectations  - Provide quiet environment  - Assist patient to identify own strengths and abilities  - Encourage patient to set small goals for self  - Encourage participation in diversional activity  - Reinforce positive adaptation of new coping behaviors  - Include patient/family/caregiver in decisions  Outcome: Progressing     Problem: Patient/Family Goals  Goal: Patient/Family Long Term Goal  Description: Patient's Long Term Goal: patient has uncomplicated delivery    Interventions:  - communicate POC  - See additional Care Plan goals for specific interventions  Outcome: Progressing  Goal: Patient/Family Short Term Goal  Description: Patient's Short Term Goal: patient will maintain afebrile    Interventions:   - communicate POC  - See additional Care Plan goals for specific interventions  Outcome: Progressing

## 2023-02-03 PROCEDURE — 99232 SBSQ HOSP IP/OBS MODERATE 35: CPT | Performed by: OBSTETRICS & GYNECOLOGY

## 2023-02-03 NOTE — PLAN OF CARE
Problem: ANTEPARTUM/LABOR and DELIVERY  Goal: Maintain pregnancy as long as maternal and/or fetal condition is stable  Description: INTERVENTIONS:  - Maternal surveillance  - Fetal surveillance  - Monitor uterine activity  - Medications as ordered  - Bedrest  Outcome: Progressing  Goal: Anxiety is at manageable level  Description: INTERVENTIONS:  - [place] patient to unit/surroundings  - Establish a trusting relationship with patient  - Discuss possible complications or alterations in birth plan  - Explain treatment plan  - Explain testing/procedures prior to initiation  - Encourage participation in care  - Encourage verbalization of concerns/fears  - Identify coping mechanisms  - Administer/offer alternative therapies (Aroma therapy, distraction, guided imagery, massage, music therapy, therapeutic touch)  - Manage patient's environment (Avoid overstimulation of patient)  Outcome: Progressing  Goal: Demonstrates ability to cope with hospitalization/illness  Description: INTERVENTIONS:  - Encourage verbalization of feelings/concerns/expectations  - Provide quiet environment  - Assist patient to identify own strengths and abilities  - Encourage patient to set small goals for self  - Encourage participation in diversional activity  - Reinforce positive adaptation of new coping behaviors  - Include patient/family/caregiver in decisions  Outcome: Progressing     Problem: Patient/Family Goals  Goal: Patient/Family Long Term Goal  Description: Patient's Long Term Goal: patient has uncomplicated delivery    Interventions:  - communicate POC  - See additional Care Plan goals for specific interventions  Outcome: Progressing  Goal: Patient/Family Short Term Goal  Description: Patient's Short Term Goal: patient will maintain afebrile    Interventions:   - communicate POC  - See additional Care Plan goals for specific interventions  Outcome: Progressing

## 2023-02-03 NOTE — PLAN OF CARE
Pt sleeping off and on, made aware of educational videos on [employer] [place] for maternal/child informatiion

## 2023-02-03 NOTE — PROGRESS NOTES
Report taken fromTaylor RN. Patient made aware of transition of Nurses and call light is within patients reach.

## 2023-02-04 PROCEDURE — 99231 SBSQ HOSP IP/OBS SF/LOW 25: CPT | Performed by: OBSTETRICS & GYNECOLOGY

## 2023-02-04 NOTE — CONSULTS
Leita Aschoff   Neonatology  Consultation    OB: Fiorella hunter  Counseling [id number]    Twin males: [place] Chavez and Nora  EFW at approx. 28 3/7 weeks = 1203 gm and 1178 gm    At the request of Dr. Fiorella hunter and M, I met w/ mom in her LDR to  the ramifications of extremely  birth and PPPROM. I have received the following information by report and reviewing her records: mom is 20 y.o. G2 currently L1 w/ EDC of  = EGA 29 5/7 wks gestation at the time of counseling. EDC is considered reliable. Pregnancy has been complicated by [facility] on  = approx. 5 days prior to counseling. Pregnancy is also complicated by twin gestation, natural, boys, Di-di. Growth is considered concordant. Mom has received ampicillin IV and Azithromycin IV, and  betamethasone  and , beginning approx. 5 days prior to counseling. She reportedly has had no fever or clinical chorioamnionitis, and no bleeding or labor. Intermittent leakage is observed. We met for approx 40+ min and had a thorough conversation about many concerns regarding birth at this gestation, especially premature and prolonged ROM, and the risk of both overt or sub-clinical chorioamnionitis/funisitis. We reviewed major issues such as RDS,  steroids, sepsis and nosocomial infection, potential mortality, length of stay (LOS) in NICU. We specifically discussed the potential impact which PPPROM and chorioamnionitis/funisitis may have on M&M and on inflammatory syndromes (FIRS) and how this inflammatory response negatively affects certain poor outcomes. We specifically discussed sepsis and NEC. We discussed improved survival and morbidity w/ lengthened gestation, as long as the fetus is stable and tolerating the situation, but we also reviewed how intrapartum crises may negatively influence outcome.  We also reviewed a number of other issues such as apnea, feeding & nutrition (including dysmotility and intolerance), and temperature control. We discussed Neonatology attendance and role at delivery. Through our conversation, she specifically understands that adverse outcomes, especially neuro-developmental problems, ROP, hearing issues, are possible as premature infants develop, even if they are discharged apparently normal.     I reviewed several of the early treatment procedures, in case delivery is emergent such as but not limited to ETT, surfactant, ventilator/CPAP, and umbilical lines. Mom agreed to these. I reviewed current recommendations for donor human milk before 32 weeks until mom can produce milk. Mom agreed to this. I reviewed CVLs for nutritional support. I gave no guarantees, and I reviewed the variety of scenarios and outcomes at this gestation, from good to severe and life-threatening. I offered for our service to return at her request and for them to tour the NICU with our  or charge RN if time and her status permits. All questions were answered, specifically no guarantees. I deferred all obstetric management questions to OB/MFM and specifically let her know that I was not involved in management of mother. Specifically, outcome statistics may change when a different condition or assessment is encountered. I specifically reviewed our practice philosophy and the independent nature of our group. Please call if you need additional counseling, or if there is significant progression or change in status.

## 2023-02-04 NOTE — PLAN OF CARE
Problem: ANTEPARTUM/LABOR and DELIVERY  Goal: Maintain pregnancy as long as maternal and/or fetal condition is stable  Description: INTERVENTIONS:  - Maternal surveillance  - Fetal surveillance  - Monitor uterine activity  - Medications as ordered  - Bedrest  Outcome: Progressing  Goal: Anxiety is at manageable level  Description: INTERVENTIONS:  - [place] patient to unit/surroundings  - Establish a trusting relationship with patient  - Discuss possible complications or alterations in birth plan  - Explain treatment plan  - Explain testing/procedures prior to initiation  - Encourage participation in care  - Encourage verbalization of concerns/fears  - Identify coping mechanisms  - Administer/offer alternative therapies (Aroma therapy, distraction, guided imagery, massage, music therapy, therapeutic touch)  - Manage patient's environment (Avoid overstimulation of patient)  Outcome: Progressing  Goal: Demonstrates ability to cope with hospitalization/illness  Description: INTERVENTIONS:  - Encourage verbalization of feelings/concerns/expectations  - Provide quiet environment  - Assist patient to identify own strengths and abilities  - Encourage patient to set small goals for self  - Encourage participation in diversional activity  - Reinforce positive adaptation of new coping behaviors  - Include patient/family/caregiver in decisions  Outcome: Progressing     Problem: Patient/Family Goals  Goal: Patient/Family Long Term Goal  Description: Patient's Long Term Goal: patient has uncomplicated delivery    Interventions:  - communicate POC  - See additional Care Plan goals for specific interventions  Outcome: Progressing  Goal: Patient/Family Short Term Goal  Description: Patient's Short Term Goal: patient will maintain afebrile    Interventions:   - communicate POC  - See additional Care Plan goals for specific interventions  Outcome: Progressing

## 2023-02-05 LAB
ANTIBODY SCREEN: NEGATIVE
RH BLOOD TYPE: POSITIVE

## 2023-02-05 PROCEDURE — 99231 SBSQ HOSP IP/OBS SF/LOW 25: CPT | Performed by: OBSTETRICS & GYNECOLOGY

## 2023-02-05 NOTE — PLAN OF CARE
Problem: ANTEPARTUM/LABOR and DELIVERY  Goal: Maintain pregnancy as long as maternal and/or fetal condition is stable  Description: INTERVENTIONS:  - Maternal surveillance  - Fetal surveillance  - Monitor uterine activity  - Medications as ordered  - Bedrest  Outcome: Progressing  Goal: Anxiety is at manageable level  Description: INTERVENTIONS:  - [place] patient to unit/surroundings  - Establish a trusting relationship with patient  - Discuss possible complications or alterations in birth plan  - Explain treatment plan  - Explain testing/procedures prior to initiation  - Encourage participation in care  - Encourage verbalization of concerns/fears  - Identify coping mechanisms  - Administer/offer alternative therapies (Aroma therapy, distraction, guided imagery, massage, music therapy, therapeutic touch)  - Manage patient's environment (Avoid overstimulation of patient)  Outcome: Progressing  Goal: Demonstrates ability to cope with hospitalization/illness  Description: INTERVENTIONS:  - Encourage verbalization of feelings/concerns/expectations  - Provide quiet environment  - Assist patient to identify own strengths and abilities  - Encourage patient to set small goals for self  - Encourage participation in diversional activity  - Reinforce positive adaptation of new coping behaviors  - Include patient/family/caregiver in decisions  Outcome: Progressing     Problem: Patient/Family Goals  Goal: Patient/Family Long Term Goal  Description: Patient's Long Term Goal: patient has uncomplicated delivery    Interventions:  - communicate POC  - See additional Care Plan goals for specific interventions  Outcome: Progressing  Goal: Patient/Family Short Term Goal  Description: Patient's Short Term Goal: patient will maintain afebrile    Interventions:   - communicate POC  - See additional Care Plan goals for specific interventions  Outcome: Progressing

## 2023-02-05 NOTE — PROGRESS NOTES
RN at bedside, patient awake and alert, ate breakfast, tolerated well, + FM x 2, still leaking clear fluid, no abdominal pain or tenderness, denies contractions, no complaints. Explained plan of care, patient verbalizes understanding.

## 2023-02-05 NOTE — PLAN OF CARE
Problem: ANTEPARTUM/LABOR and DELIVERY  Goal: Maintain pregnancy as long as maternal and/or fetal condition is stable  Description: INTERVENTIONS:  - Maternal surveillance  - Fetal surveillance  - Monitor uterine activity  - Medications as ordered  - Bedrest  Outcome: Progressing  Goal: Anxiety is at manageable level  Description: INTERVENTIONS:  - [place] patient to unit/surroundings  - Establish a trusting relationship with patient  - Discuss possible complications or alterations in birth plan  - Explain treatment plan  - Explain testing/procedures prior to initiation  - Encourage participation in care  - Encourage verbalization of concerns/fears  - Identify coping mechanisms  - Administer/offer alternative therapies (Aroma therapy, distraction, guided imagery, massage, music therapy, therapeutic touch)  - Manage patient's environment (Avoid overstimulation of patient)  Outcome: Progressing  Goal: Demonstrates ability to cope with hospitalization/illness  Description: INTERVENTIONS:  - Encourage verbalization of feelings/concerns/expectations  - Provide quiet environment  - Assist patient to identify own strengths and abilities  - Encourage patient to set small goals for self  - Encourage participation in diversional activity  - Reinforce positive adaptation of new coping behaviors  - Include patient/family/caregiver in decisions  Outcome: Progressing

## 2023-02-06 ENCOUNTER — ULTRASOUND ENCOUNTER (OUTPATIENT)
Dept: PERINATAL CARE | Facility: HOSPITAL | Age: 23
End: 2023-02-06
Attending: OBSTETRICS & GYNECOLOGY
Payer: MEDICAID

## 2023-02-06 DIAGNOSIS — O42.919 PRETERM PREMATURE RUPTURE OF MEMBRANES (PPROM) WITH UNKNOWN ONSET OF LABOR: ICD-10-CM

## 2023-02-06 DIAGNOSIS — O30.043 DICHORIONIC DIAMNIOTIC TWIN PREGNANCY IN THIRD TRIMESTER: ICD-10-CM

## 2023-02-06 DIAGNOSIS — O10.919 CHRONIC HYPERTENSION AFFECTING PREGNANCY: ICD-10-CM

## 2023-02-06 PROCEDURE — 76816 OB US FOLLOW-UP PER FETUS: CPT | Performed by: OBSTETRICS & GYNECOLOGY

## 2023-02-06 PROCEDURE — 76820 UMBILICAL ARTERY ECHO: CPT

## 2023-02-06 PROCEDURE — 99231 SBSQ HOSP IP/OBS SF/LOW 25: CPT | Performed by: OBSTETRICS & GYNECOLOGY

## 2023-02-06 NOTE — PROGRESS NOTES
Report given to [facility].  Relinquished care of patient, patient in stable condition

## 2023-02-06 NOTE — PLAN OF CARE
Problem: ANTEPARTUM/LABOR and DELIVERY  Goal: Maintain pregnancy as long as maternal and/or fetal condition is stable  Description: INTERVENTIONS:  - Maternal surveillance  - Fetal surveillance  - Monitor uterine activity  - Medications as ordered  - Bedrest  Outcome: Progressing  Goal: Anxiety is at manageable level  Description: INTERVENTIONS:  - [place] patient to unit/surroundings  - Establish a trusting relationship with patient  - Discuss possible complications or alterations in birth plan  - Explain treatment plan  - Explain testing/procedures prior to initiation  - Encourage participation in care  - Encourage verbalization of concerns/fears  - Identify coping mechanisms  - Administer/offer alternative therapies (Aroma therapy, distraction, guided imagery, massage, music therapy, therapeutic touch)  - Manage patient's environment (Avoid overstimulation of patient)  Outcome: Progressing  Goal: Demonstrates ability to cope with hospitalization/illness  Description: INTERVENTIONS:  - Encourage verbalization of feelings/concerns/expectations  - Provide quiet environment  - Assist patient to identify own strengths and abilities  - Encourage patient to set small goals for self  - Encourage participation in diversional activity  - Reinforce positive adaptation of new coping behaviors  - Include patient/family/caregiver in decisions  Outcome: Progressing     Problem: Patient/Family Goals  Goal: Patient/Family Long Term Goal  Description: Patient's Long Term Goal: patient has uncomplicated delivery    Interventions:  - communicate POC  - See additional Care Plan goals for specific interventions  Outcome: Progressing  Goal: Patient/Family Short Term Goal  Description: Patient's Short Term Goal: patient will maintain afebrile    Interventions:   - communicate POC  - See additional Care Plan goals for specific interventions  Outcome: Progressing

## 2023-02-06 NOTE — PLAN OF CARE
Problem: ANTEPARTUM/LABOR and DELIVERY  Goal: Maintain pregnancy as long as maternal and/or fetal condition is stable  Description: INTERVENTIONS:  - Maternal surveillance  - Fetal surveillance  - Monitor uterine activity  - Medications as ordered  - Bedrest  Outcome: Progressing  Goal: Anxiety is at manageable level  Description: INTERVENTIONS:  - [place] patient to unit/surroundings  - Establish a trusting relationship with patient  - Discuss possible complications or alterations in birth plan  - Explain treatment plan  - Explain testing/procedures prior to initiation  - Encourage participation in care  - Encourage verbalization of concerns/fears  - Identify coping mechanisms  - Administer/offer alternative therapies (Aroma therapy, distraction, guided imagery, massage, music therapy, therapeutic touch)  - Manage patient's environment (Avoid overstimulation of patient)  Outcome: Progressing  Goal: Demonstrates ability to cope with hospitalization/illness  Description: INTERVENTIONS:  - Encourage verbalization of feelings/concerns/expectations  - Provide quiet environment  - Assist patient to identify own strengths and abilities  - Encourage patient to set small goals for self  - Encourage participation in diversional activity  - Reinforce positive adaptation of new coping behaviors  - Include patient/family/caregiver in decisions  Outcome: Progressing     Problem: Patient/Family Goals  Goal: Patient/Family Long Term Goal  Description: Patient's Long Term Goal: patient has uncomplicated delivery    Interventions:  - communicate POC  - See additional Care Plan goals for specific interventions  Outcome: Progressing  Goal: Patient/Family Short Term Goal  Description: Patient's Short Term Goal: patient will maintain afebrile    Interventions:   - communicate POC  - See additional Care Plan goals for specific interventions  Outcome: Progressing

## 2023-02-07 PROCEDURE — 99233 SBSQ HOSP IP/OBS HIGH 50: CPT | Performed by: STUDENT IN AN ORGANIZED HEALTH CARE EDUCATION/TRAINING PROGRAM

## 2023-02-07 NOTE — PLAN OF CARE
Problem: ANTEPARTUM/LABOR and DELIVERY  Goal: Maintain pregnancy as long as maternal and/or fetal condition is stable  Description: INTERVENTIONS:  - Maternal surveillance  - Fetal surveillance  - Monitor uterine activity  - Medications as ordered  - Bedrest  Outcome: Progressing  Goal: Anxiety is at manageable level  Description: INTERVENTIONS:  - [place] patient to unit/surroundings  - Establish a trusting relationship with patient  - Discuss possible complications or alterations in birth plan  - Explain treatment plan  - Explain testing/procedures prior to initiation  - Encourage participation in care  - Encourage verbalization of concerns/fears  - Identify coping mechanisms  - Administer/offer alternative therapies (Aroma therapy, distraction, guided imagery, massage, music therapy, therapeutic touch)  - Manage patient's environment (Avoid overstimulation of patient)  Outcome: Progressing  Goal: Demonstrates ability to cope with hospitalization/illness  Description: INTERVENTIONS:  - Encourage verbalization of feelings/concerns/expectations  - Provide quiet environment  - Assist patient to identify own strengths and abilities  - Encourage patient to set small goals for self  - Encourage participation in diversional activity  - Reinforce positive adaptation of new coping behaviors  - Include patient/family/caregiver in decisions  Outcome: Progressing     Problem: Patient/Family Goals  Goal: Patient/Family Long Term Goal  Description: Patient's Long Term Goal: patient has uncomplicated delivery    Interventions:  - communicate POC  - See additional Care Plan goals for specific interventions  Outcome: Progressing  Goal: Patient/Family Short Term Goal  Description: Patient's Short Term Goal: patient will maintain afebrile    Interventions:   - communicate POC  - See additional Care Plan goals for specific interventions  Outcome: Progressing

## 2023-02-08 LAB
ANTIBODY SCREEN: NEGATIVE
RH BLOOD TYPE: POSITIVE

## 2023-02-08 PROCEDURE — 99231 SBSQ HOSP IP/OBS SF/LOW 25: CPT | Performed by: OBSTETRICS & GYNECOLOGY

## 2023-02-08 NOTE — PLAN OF CARE
Problem: ANTEPARTUM/LABOR and DELIVERY  Goal: Maintain pregnancy as long as maternal and/or fetal condition is stable  Description: INTERVENTIONS:  - Maternal surveillance  - Fetal surveillance  - Monitor uterine activity  - Medications as ordered  - Bedrest  Outcome: Progressing  Goal: Anxiety is at manageable level  Description: INTERVENTIONS:  - [place] patient to unit/surroundings  - Establish a trusting relationship with patient  - Discuss possible complications or alterations in birth plan  - Explain treatment plan  - Explain testing/procedures prior to initiation  - Encourage participation in care  - Encourage verbalization of concerns/fears  - Identify coping mechanisms  - Administer/offer alternative therapies (Aroma therapy, distraction, guided imagery, massage, music therapy, therapeutic touch)  - Manage patient's environment (Avoid overstimulation of patient)  Outcome: Progressing  Goal: Demonstrates ability to cope with hospitalization/illness  Description: INTERVENTIONS:  - Encourage verbalization of feelings/concerns/expectations  - Provide quiet environment  - Assist patient to identify own strengths and abilities  - Encourage patient to set small goals for self  - Encourage participation in diversional activity  - Reinforce positive adaptation of new coping behaviors  - Include patient/family/caregiver in decisions  Outcome: Progressing     Problem: Patient/Family Goals  Goal: Patient/Family Long Term Goal  Description: Patient's Long Term Goal: patient has uncomplicated delivery    Interventions:  - communicate POC  - See additional Care Plan goals for specific interventions  Outcome: Progressing  Goal: Patient/Family Short Term Goal  Description: Patient's Short Term Goal: patient will maintain afebrile    Interventions:   - communicate POC  - See additional Care Plan goals for specific interventions  Outcome: Progressing

## 2023-02-09 PROCEDURE — 99232 SBSQ HOSP IP/OBS MODERATE 35: CPT | Performed by: OBSTETRICS & GYNECOLOGY

## 2023-02-09 NOTE — CM/SW NOTE
SW placed phone call to RN, Dolly Rock to review social work order. Per RN, no current needs. Patient presenting with appropriate affect. SW requested RN to call/put in new order, if appropriate. SW to remain available.     Rodney Sahu LCSW  /Discharge Planner

## 2023-02-09 NOTE — PLAN OF CARE
Problem: ANTEPARTUM/LABOR and DELIVERY  Goal: Maintain pregnancy as long as maternal and/or fetal condition is stable  Description: INTERVENTIONS:  - Maternal surveillance  - Fetal surveillance  - Monitor uterine activity  - Medications as ordered  - Bedrest  Outcome: Progressing  Goal: Anxiety is at manageable level  Description: INTERVENTIONS:  - [place] patient to unit/surroundings  - Establish a trusting relationship with patient  - Discuss possible complications or alterations in birth plan  - Explain treatment plan  - Explain testing/procedures prior to initiation  - Encourage participation in care  - Encourage verbalization of concerns/fears  - Identify coping mechanisms  - Administer/offer alternative therapies (Aroma therapy, distraction, guided imagery, massage, music therapy, therapeutic touch)  - Manage patient's environment (Avoid overstimulation of patient)  Outcome: Progressing  Goal: Demonstrates ability to cope with hospitalization/illness  Description: INTERVENTIONS:  - Encourage verbalization of feelings/concerns/expectations  - Provide quiet environment  - Assist patient to identify own strengths and abilities  - Encourage patient to set small goals for self  - Encourage participation in diversional activity  - Reinforce positive adaptation of new coping behaviors  - Include patient/family/caregiver in decisions  Outcome: Progressing     Problem: Patient/Family Goals  Goal: Patient/Family Long Term Goal  Description: Patient's Long Term Goal: patient has uncomplicated delivery    Interventions:  - communicate POC  - See additional Care Plan goals for specific interventions  Outcome: Progressing  Goal: Patient/Family Short Term Goal  Description: Patient's Short Term Goal: patient will maintain afebrile    Interventions:   - communicate POC  - See additional Care Plan goals for specific interventions  Outcome: Progressing

## 2023-02-09 NOTE — PLAN OF CARE
Problem: ANTEPARTUM/LABOR and DELIVERY  Goal: Maintain pregnancy as long as maternal and/or fetal condition is stable  Description: INTERVENTIONS:  - Maternal surveillance  - Fetal surveillance  - Monitor uterine activity  - Medications as ordered  - Bedrest  Outcome: Progressing  Goal: Anxiety is at manageable level  Description: INTERVENTIONS:  - [place] patient to unit/surroundings  - Establish a trusting relationship with patient  - Discuss possible complications or alterations in birth plan  - Explain treatment plan  - Explain testing/procedures prior to initiation  - Encourage participation in care  - Encourage verbalization of concerns/fears  - Identify coping mechanisms  - Administer/offer alternative therapies (Aroma therapy, distraction, guided imagery, massage, music therapy, therapeutic touch)  - Manage patient's environment (Avoid overstimulation of patient)  Outcome: Progressing  Goal: Demonstrates ability to cope with hospitalization/illness  Description: INTERVENTIONS:  - Encourage verbalization of feelings/concerns/expectations  - Provide quiet environment  - Assist patient to identify own strengths and abilities  - Encourage patient to set small goals for self  - Encourage participation in diversional activity  - Reinforce positive adaptation of new coping behaviors  - Include patient/family/caregiver in decisions  Outcome: Progressing     Problem: Patient/Family Goals  Goal: Patient/Family Long Term Goal  Description: Patient's Long Term Goal: patient has uncomplicated delivery    Interventions:  - communicate POC  - See additional Care Plan goals for specific interventions  Outcome: Progressing  Goal: Patient/Family Short Term Goal  Description: Patient's Short Term Goal: patient will maintain afebrile    Interventions:   - communicate POC  - See additional Care Plan goals for specific interventions  Outcome: Progressing

## 2023-02-10 PROCEDURE — 99231 SBSQ HOSP IP/OBS SF/LOW 25: CPT | Performed by: OBSTETRICS & GYNECOLOGY

## 2023-02-10 NOTE — PLAN OF CARE
Problem: ANTEPARTUM/LABOR and DELIVERY  Goal: Maintain pregnancy as long as maternal and/or fetal condition is stable  Description: INTERVENTIONS:  - Maternal surveillance  - Fetal surveillance  - Monitor uterine activity  - Medications as ordered  - Bedrest  Outcome: Progressing  Goal: Anxiety is at manageable level  Description: INTERVENTIONS:  - [place] patient to unit/surroundings  - Establish a trusting relationship with patient  - Discuss possible complications or alterations in birth plan  - Explain treatment plan  - Explain testing/procedures prior to initiation  - Encourage participation in care  - Encourage verbalization of concerns/fears  - Identify coping mechanisms  - Administer/offer alternative therapies (Aroma therapy, distraction, guided imagery, massage, music therapy, therapeutic touch)  - Manage patient's environment (Avoid overstimulation of patient)  Outcome: Progressing  Goal: Demonstrates ability to cope with hospitalization/illness  Description: INTERVENTIONS:  - Encourage verbalization of feelings/concerns/expectations  - Provide quiet environment  - Assist patient to identify own strengths and abilities  - Encourage patient to set small goals for self  - Encourage participation in diversional activity  - Reinforce positive adaptation of new coping behaviors  - Include patient/family/caregiver in decisions  Outcome: Progressing     Problem: Patient/Family Goals  Goal: Patient/Family Long Term Goal  Description: Patient's Long Term Goal: patient has uncomplicated delivery    Interventions:  - communicate POC  - See additional Care Plan goals for specific interventions  Outcome: Progressing  Goal: Patient/Family Short Term Goal  Description: Patient's Short Term Goal: patient will maintain afebrile    Interventions:   - communicate POC  - See additional Care Plan goals for specific interventions  Outcome: Progressing

## 2023-02-10 NOTE — PLAN OF CARE
Problem: ANTEPARTUM/LABOR and DELIVERY  Goal: Maintain pregnancy as long as maternal and/or fetal condition is stable  Description: INTERVENTIONS:  - Maternal surveillance  - Fetal surveillance  - Monitor uterine activity  - Medications as ordered  - Bedrest  Outcome: Progressing  Goal: Anxiety is at manageable level  Description: INTERVENTIONS:  - [place] patient to unit/surroundings  - Establish a trusting relationship with patient  - Discuss possible complications or alterations in birth plan  - Explain treatment plan  - Explain testing/procedures prior to initiation  - Encourage participation in care  - Encourage verbalization of concerns/fears  - Identify coping mechanisms  - Administer/offer alternative therapies (Aroma therapy, distraction, guided imagery, massage, music therapy, therapeutic touch)  - Manage patient's environment (Avoid overstimulation of patient)  Outcome: Progressing  Goal: Demonstrates ability to cope with hospitalization/illness  Description: INTERVENTIONS:  - Encourage verbalization of feelings/concerns/expectations  - Provide quiet environment  - Assist patient to identify own strengths and abilities  - Encourage patient to set small goals for self  - Encourage participation in diversional activity  - Reinforce positive adaptation of new coping behaviors  - Include patient/family/caregiver in decisions  Outcome: Progressing     Problem: Patient/Family Goals  Goal: Patient/Family Long Term Goal  Description: Patient's Long Term Goal: patient has uncomplicated delivery    Interventions:  - communicate POC  - See additional Care Plan goals for specific interventions  Outcome: Progressing  Goal: Patient/Family Short Term Goal  Description: Patient's Short Term Goal: patient will maintain afebrile    Interventions:   - communicate POC  - See additional Care Plan goals for specific interventions  Outcome: Progressing

## 2023-02-11 ENCOUNTER — ANESTHESIA (OUTPATIENT)
Dept: OBGYN UNIT | Facility: HOSPITAL | Age: 23
End: 2023-02-11
Payer: MEDICAID

## 2023-02-11 ENCOUNTER — ANESTHESIA EVENT (OUTPATIENT)
Dept: OBGYN UNIT | Facility: HOSPITAL | Age: 23
End: 2023-02-11
Payer: MEDICAID

## 2023-02-11 LAB
ANTIBODY SCREEN: NEGATIVE
BASOPHILS # BLD AUTO: 0.04 X10(3) UL (ref 0–0.2)
BASOPHILS NFR BLD AUTO: 0.3 %
EOSINOPHIL # BLD AUTO: 0.21 X10(3) UL (ref 0–0.7)
EOSINOPHIL NFR BLD AUTO: 1.7 %
ERYTHROCYTE [DISTWIDTH] IN BLOOD BY AUTOMATED COUNT: 13.2 %
HCT VFR BLD AUTO: 34.7 %
HGB BLD-MCNC: 11.7 G/DL
IMM GRANULOCYTES # BLD AUTO: 0.11 X10(3) UL (ref 0–1)
IMM GRANULOCYTES NFR BLD: 0.9 %
LYMPHOCYTES # BLD AUTO: 3.77 X10(3) UL (ref 1–4)
LYMPHOCYTES NFR BLD AUTO: 30.5 %
MCH RBC QN AUTO: 32.1 PG (ref 26–34)
MCHC RBC AUTO-ENTMCNC: 33.7 G/DL (ref 31–37)
MCV RBC AUTO: 95.3 FL
MONOCYTES # BLD AUTO: 1.09 X10(3) UL (ref 0.1–1)
MONOCYTES NFR BLD AUTO: 8.8 %
NEUTROPHILS # BLD AUTO: 7.13 X10 (3) UL (ref 1.5–7.7)
NEUTROPHILS # BLD AUTO: 7.13 X10(3) UL (ref 1.5–7.7)
NEUTROPHILS NFR BLD AUTO: 57.8 %
PLATELET # BLD AUTO: 223 10(3)UL (ref 150–450)
RBC # BLD AUTO: 3.64 X10(6)UL
RH BLOOD TYPE: POSITIVE
WBC # BLD AUTO: 12.4 X10(3) UL (ref 4–11)

## 2023-02-11 PROCEDURE — 59409 OBSTETRICAL CARE: CPT | Performed by: OBSTETRICS & GYNECOLOGY

## 2023-02-11 PROCEDURE — 59899 UNLISTED PX MAT CARE&DLVR: CPT | Performed by: OBSTETRICS & GYNECOLOGY

## 2023-02-11 RX ORDER — CALCIUM GLUCONATE 94 MG/ML
1 INJECTION, SOLUTION INTRAVENOUS ONCE AS NEEDED
Status: DISCONTINUED | OUTPATIENT
Start: 2023-02-11 | End: 2023-02-11

## 2023-02-11 RX ORDER — DOCUSATE SODIUM 100 MG/1
100 CAPSULE, LIQUID FILLED ORAL
Status: DISCONTINUED | OUTPATIENT
Start: 2023-02-11 | End: 2023-02-13

## 2023-02-11 RX ORDER — AMPICILLIN 2 G/1
INJECTION, POWDER, FOR SOLUTION INTRAVENOUS
Status: DISCONTINUED
Start: 2023-02-11 | End: 2023-02-11

## 2023-02-11 RX ORDER — ACETAMINOPHEN 500 MG
500 TABLET ORAL EVERY 6 HOURS PRN
Status: DISCONTINUED | OUTPATIENT
Start: 2023-02-11 | End: 2023-02-11

## 2023-02-11 RX ORDER — LIDOCAINE HYDROCHLORIDE 10 MG/ML
INJECTION, SOLUTION EPIDURAL; INFILTRATION; INTRACAUDAL; PERINEURAL AS NEEDED
Status: DISCONTINUED | OUTPATIENT
Start: 2023-02-11 | End: 2023-02-11 | Stop reason: SURG

## 2023-02-11 RX ORDER — BISACODYL 10 MG
10 SUPPOSITORY, RECTAL RECTAL ONCE AS NEEDED
Status: DISCONTINUED | OUTPATIENT
Start: 2023-02-11 | End: 2023-02-11

## 2023-02-11 RX ORDER — NALBUPHINE HCL 10 MG/ML
2.5 AMPUL (ML) INJECTION
Status: DISCONTINUED | OUTPATIENT
Start: 2023-02-11 | End: 2023-02-11

## 2023-02-11 RX ORDER — LIDOCAINE HYDROCHLORIDE AND EPINEPHRINE 15; 5 MG/ML; UG/ML
INJECTION, SOLUTION EPIDURAL AS NEEDED
Status: DISCONTINUED | OUTPATIENT
Start: 2023-02-11 | End: 2023-02-11 | Stop reason: SURG

## 2023-02-11 RX ORDER — ACETAMINOPHEN 500 MG
1000 TABLET ORAL EVERY 6 HOURS PRN
Status: DISCONTINUED | OUTPATIENT
Start: 2023-02-11 | End: 2023-02-13

## 2023-02-11 RX ORDER — DOCUSATE SODIUM 100 MG/1
100 CAPSULE, LIQUID FILLED ORAL
Status: DISCONTINUED | OUTPATIENT
Start: 2023-02-11 | End: 2023-02-11

## 2023-02-11 RX ORDER — ACETAMINOPHEN 500 MG
1000 TABLET ORAL EVERY 6 HOURS PRN
Status: DISCONTINUED | OUTPATIENT
Start: 2023-02-11 | End: 2023-02-11

## 2023-02-11 RX ORDER — METHYLERGONOVINE MALEATE 0.2 MG/ML
INJECTION INTRAVENOUS
Status: COMPLETED
Start: 2023-02-11 | End: 2023-02-11

## 2023-02-11 RX ORDER — IBUPROFEN 600 MG/1
600 TABLET ORAL EVERY 6 HOURS
Status: DISCONTINUED | OUTPATIENT
Start: 2023-02-11 | End: 2023-02-13

## 2023-02-11 RX ORDER — BISACODYL 10 MG
10 SUPPOSITORY, RECTAL RECTAL ONCE AS NEEDED
Status: DISCONTINUED | OUTPATIENT
Start: 2023-02-11 | End: 2023-02-13

## 2023-02-11 RX ORDER — ACETAMINOPHEN 500 MG
500 TABLET ORAL EVERY 6 HOURS PRN
Status: DISCONTINUED | OUTPATIENT
Start: 2023-02-11 | End: 2023-02-13

## 2023-02-11 RX ORDER — SIMETHICONE 80 MG
80 TABLET,CHEWABLE ORAL 3 TIMES DAILY PRN
Status: DISCONTINUED | OUTPATIENT
Start: 2023-02-11 | End: 2023-02-13

## 2023-02-11 RX ORDER — BUPIVACAINE HCL/0.9 % NACL/PF 0.25 %
5 PLASTIC BAG, INJECTION (ML) EPIDURAL AS NEEDED
Status: DISCONTINUED | OUTPATIENT
Start: 2023-02-11 | End: 2023-02-11

## 2023-02-11 RX ADMIN — LIDOCAINE HYDROCHLORIDE 25 MG: 10 INJECTION, SOLUTION EPIDURAL; INFILTRATION; INTRACAUDAL; PERINEURAL at 09:19:00

## 2023-02-11 RX ADMIN — LIDOCAINE HYDROCHLORIDE AND EPINEPHRINE 5 ML: 15; 5 INJECTION, SOLUTION EPIDURAL at 09:29:00

## 2023-02-11 NOTE — PROGRESS NOTES
Patient up to bathroom with assist x 1. Voided 600 ml. Patient transferred to mother/baby room 2215 per wheelchair in stable condition with baby and personal belongings. Accompanied by significant other and staff. Report given to mother/baby KARMEN Dalal RN.

## 2023-02-11 NOTE — PLAN OF CARE
Problem: ANTEPARTUM/LABOR and DELIVERY  Goal: Maintain pregnancy as long as maternal and/or fetal condition is stable  Description: INTERVENTIONS:  - Maternal surveillance  - Fetal surveillance  - Monitor uterine activity  - Medications as ordered  - Bedrest  Outcome: Progressing  Goal: Anxiety is at manageable level  Description: INTERVENTIONS:  - [place] patient to unit/surroundings  - Establish a trusting relationship with patient  - Discuss possible complications or alterations in birth plan  - Explain treatment plan  - Explain testing/procedures prior to initiation  - Encourage participation in care  - Encourage verbalization of concerns/fears  - Identify coping mechanisms  - Administer/offer alternative therapies (Aroma therapy, distraction, guided imagery, massage, music therapy, therapeutic touch)  - Manage patient's environment (Avoid overstimulation of patient)  Outcome: Progressing  Goal: Demonstrates ability to cope with hospitalization/illness  Description: INTERVENTIONS:  - Encourage verbalization of feelings/concerns/expectations  - Provide quiet environment  - Assist patient to identify own strengths and abilities  - Encourage patient to set small goals for self  - Encourage participation in diversional activity  - Reinforce positive adaptation of new coping behaviors  - Include patient/family/caregiver in decisions  Outcome: Progressing

## 2023-02-11 NOTE — PLAN OF CARE
Problem: Patient/Family Goals  Goal: Patient/Family Long Term Goal  Description: Patient's Long Term Goal: patient has uncomplicated delivery    Interventions:  - communicate POC  - See additional Care Plan goals for specific interventions  Outcome: Progressing  Goal: Patient/Family Short Term Goal  Description: Patient's Short Term Goal: patient will maintain afebrile    Interventions:   - communicate POC  - See additional Care Plan goals for specific interventions  Outcome: Progressing     Problem: POSTPARTUM  Goal: Long Term Goal:Experiences normal postpartum course  Description: INTERVENTIONS:  - Assess and monitor vital signs and lab values. - Assess fundus and lochia. - Provide ice/sitz baths for perineum discomfort. - Monitor healing of incision/episiotomy/laceration, and assess for signs and symptoms of infection and hematoma. - Assess bladder function and monitor for bladder distention.  - Provide/instruct/assist with pericare as needed. - Provide VTE prophylaxis as needed. - Monitor bowel function.  - Encourage ambulation and provide assistance as needed. - Assess and monitor emotional status and provide social service/psych resources as needed. - Utilize standard precautions and use personal protective equipment as indicated. Ensure aseptic care of all intravenous lines and invasive tubes/drains.  - Obtain immunization and exposure to communicable diseases history. Outcome: Progressing  Goal: Optimize infant feeding at the breast  Description: INTERVENTIONS:  - Initiate breast feeding within first hour after birth. - Monitor effectiveness of current breast feeding efforts. - Assess support systems available to mother/family.  - Identify cultural beliefs/practices regarding lactation, letdown techniques, maternal food preferences.   - Assess mother's knowledge and previous experience with breast feeding.  - Provide information as needed about early infant feeding cues (e.g., rooting, lip smacking, sucking fingers/hand) versus late cue of crying.  - Discuss/demonstrate breast feeding aids (e.g., infant sling, nursing footstool/pillows, and breast pumps). - Encourage mother/other family members to express feelings/concerns, and actively listen. - Educate father/SO about benefits of breast feeding and how to manage common lactation challenges. - Recommend avoidance of specific medications or substances incompatible with breast feeding.  - Assess and monitor for signs of nipple pain/trauma. - Instruct and provide assistance with proper latch. - Review techniques for milk expression (breast pumping) and storage of breast milk. Provide pumping equipment/supplies, instructions and assistance, as needed. - Encourage rooming-in and breast feeding on demand.  - Encourage skin-to-skin contact. - Provide LC support as needed. - Assess for and manage engorgement. - Provide breast feeding education handouts and information on community breast feeding support. Outcome: Progressing  Goal: Establishment of adequate milk supply with medication/procedure interruptions  Description: INTERVENTIONS:  - Review techniques for milk expression (breast pumping). - Provide pumping equipment/supplies, instructions, and assistance until it is safe to breastfeed infant. Outcome: Progressing  Goal: Experiences normal breast weaning course  Description: INTERVENTIONS:  - Assess for and manage engorgement. - Instruct on breast care. - Provide comfort measures. Outcome: Progressing  Goal: Appropriate maternal -  bonding  Description: INTERVENTIONS:  - Assess caregiver- interactions. - Assess caregiver's emotional status and coping mechanisms. - Encourage caregiver to participate in  daily care. - Assess support systems available to mother/family.  - Provide /case management support as needed.   Outcome: Progressing

## 2023-02-11 NOTE — PLAN OF CARE
Problem: ANTEPARTUM/LABOR and DELIVERY  Goal: Maintain pregnancy as long as maternal and/or fetal condition is stable  Description: INTERVENTIONS:  - Maternal surveillance  - Fetal surveillance  - Monitor uterine activity  - Medications as ordered  - Bedrest  Outcome: Progressing  Goal: Anxiety is at manageable level  Description: INTERVENTIONS:  - [place] patient to unit/surroundings  - Establish a trusting relationship with patient  - Discuss possible complications or alterations in birth plan  - Explain treatment plan  - Explain testing/procedures prior to initiation  - Encourage participation in care  - Encourage verbalization of concerns/fears  - Identify coping mechanisms  - Administer/offer alternative therapies (Aroma therapy, distraction, guided imagery, massage, music therapy, therapeutic touch)  - Manage patient's environment (Avoid overstimulation of patient)  Outcome: Progressing  Goal: Demonstrates ability to cope with hospitalization/illness  Description: INTERVENTIONS:  - Encourage verbalization of feelings/concerns/expectations  - Provide quiet environment  - Assist patient to identify own strengths and abilities  - Encourage patient to set small goals for self  - Encourage participation in diversional activity  - Reinforce positive adaptation of new coping behaviors  - Include patient/family/caregiver in decisions  Outcome: Progressing     Problem: Patient/Family Goals  Goal: Patient/Family Long Term Goal  Description: Patient's Long Term Goal: patient has uncomplicated delivery    Interventions:  - communicate POC  - See additional Care Plan goals for specific interventions  Outcome: Progressing  Goal: Patient/Family Short Term Goal  Description: Patient's Short Term Goal: patient will maintain afebrile    Interventions:   - communicate POC  - See additional Care Plan goals for specific interventions  Outcome: Progressing

## 2023-02-11 NOTE — L&D DELIVERY NOTE
Jose A Catalan  1 [[id number]]    Labor Events     labor?: Yes   steroids?: Full Course  Antibiotics received during labor?: Yes  Antibiotics (enter # doses in comment): ampicillin  Rupture date/time: 2023 0855     Rupture type: SROM  Fluid color: Clear  Induction: None  Augmentation: None  Intrapartum & labor complications:  labor     Labor Event Times    Labor onset date/time: 2023 0700  Dilation complete date/time: 2023 1107  Start pushing date/time: 2023 1117      Presentation    Presentation: Vertex     Operative Delivery    Operative Vaginal Delivery: N/A            Shoulder Dystocia    Shoulder Dystocia: N/A     Anesthesia    Method: Epidural           Delivery    Head delivery date/time: 2023 11:19:24   Delivery date/time:  23 11:19:24   Delivery type: Normal spontaneous vaginal delivery    Details:     Delivery location: OR     Delivery Providers    Delivering Clinician: Anjelica De La Torre,    Delivery personnel:  Provider Role   Dany Ingram, RN Baby Nurse   Tuan Thomas, RN Delivery Nurse   Covert, Joni De La O MD Neonatologist         Cord    Vessels: 3 Vessels  Complications: None  Timed cord clamping: Yes  Time in sec: 61  Cord blood disposition: to lab     [place] Measurements    Weight: 1450 g 3 lb 3.2 oz          Placenta    Date/time: 2023 1133  Removal: Spontaneous  Appearance: Intact  Disposition: Pathology     Apgars    Living status: Living   Apgar Scoring Key:    0 1 2    Skin color Blue or pale Acrocyanotic Completely pink    Heart rate Absent <100 bpm >100 bpm    Reflex irritability No response Grimace Cry or active withdrawal    Muscle tone Limp Some flexion Active motion    Respiratory effort Absent Weak cry; hypoventilation Good, crying              1 Minute:  5 Minute:  10 Minute:  15 Minute:  20 Minute:    Skin color: 0  1       Heart rate: 2  2       Reflex irritablity: 1  2       Muscle tone: 2  2 Respiratory effort: 2  2       Total: 7  9          Apgars assigned by: COVERT MD  [place] disposition: NICU     Skin to Skin    No data filed     Vaginal Count    Initial count RN: Sherri Gutierrez RN  Initial count Tech: Katerina Jarrell    Initial counts 11   0    Final counts 11   0    Final count RN: Sherri Gutierrez RN  Final count MD: Antoine Lewis DO     Delivery (Maternal)    Episiotomy: None  Perineal lacerations: None    Vaginal laceration?: No    Cervical laceration?: No    Clitoral laceration?: No          Fe Jose A Rogers  2 [[id number]]    Labor Events     labor?: Yes   steroids?: Full Course  Antibiotics received during labor?: Yes  Antibiotics (enter # doses in comment): ampicillin  Rupture date/time: 2023     Rupture type: SROM  Fluid color: Clear  Induction: None  Augmentation: None  Intrapartum & labor complications: Other - see comments  Intrapartum & labor complications comment: GBS unknown     Labor Event Times    Labor onset date/time: 2023 0700  Start pushing date/time: 2023 1124     [place] Presentation    Presentation: Vertex  Position: Occiput Anterior     Operative Delivery    Operative Vaginal Delivery: N/A            Shoulder Dystocia    Shoulder Dystocia: N/A     Anesthesia    Method: Epidural           Delivery    Head delivery date/time: 2023 11:26:45   Delivery date/time:  23 11:26:48   Delivery type: Normal spontaneous vaginal delivery    Details:     Delivery location: OR     Delivery Providers    Delivering Clinician: Antoine Lewis DO   Delivery personnel:  Provider Role   Juhi Joy, Loki Vision Alena Concepcion Baby Nurse   Sherri Gutierrez RN Delivery Nurse   Micha Delarosa MD Neonatologist         Cord    Vessels: 3 Vessels  Complications: None  Timed cord clamping: Yes  Time in sec: 60  Cord blood disposition: to lab     [place] Measurements    Weight: 1584 g 3 lb 7.9 oz    Head circum. : 29 cm Chest circum. : 25 cm      Abdominal circum. : 24 cm       Placenta    Date/time: 2023 1131  Removal: Spontaneous  Disposition: Pathology     Apgars    Living status: Living   Apgar Scoring Key:    0 1 2    Skin color Blue or pale Acrocyanotic Completely pink    Heart rate Absent <100 bpm >100 bpm    Reflex irritability No response Grimace Cry or active withdrawal    Muscle tone Limp Some flexion Active motion    Respiratory effort Absent Weak cry; hypoventilation Good, crying              1 Minute:  5 Minute:  10 Minute:  15 Minute:  20 Minute:    Skin color: 0  1       Heart rate: 2  2       Reflex irritablity: 1  2       Muscle tone: 2  2       Respiratory effort: 2  2       Total: 7  9          Apgars assigned by: COVERT MD   disposition: NICU     Skin to Skin    No data filed     Vaginal Count    Initial count RN: Tory Koch RN  Initial count Tech: Geraldene [place]   Sponges   Sharps    Initial counts 11   0    Final counts 11   0    Final count RN: Tory Koch RN  Final count MD: Vane Breen, DO     Delivery (Maternal)    Episiotomy: None  Perineal lacerations: None    Vaginal laceration?: No    Cervical laceration?: No    Clitoral laceration?: No            20 y.o.  at 27w9d, PPROM at 29w1d, started having regular painful contractions . Progresses to complete dilation , delivered Twin A - male APGARS 7/9 cephalic, cord clamped ax2 and cut after 60 seconds. Twin B preentation confirm by exam, ADELE performed, and Twin B male APGARS 7/9, delivered without complications. Cord clamped x2 and cut after 30 seconds. Placenta delivered x2 intact, Methergin 20 mg IM given. Good hemostasis. Intact perineum.

## 2023-02-12 LAB
BASOPHILS # BLD AUTO: 0.05 X10(3) UL (ref 0–0.2)
BASOPHILS NFR BLD AUTO: 0.4 %
EOSINOPHIL # BLD AUTO: 0.2 X10(3) UL (ref 0–0.7)
EOSINOPHIL NFR BLD AUTO: 1.6 %
ERYTHROCYTE [DISTWIDTH] IN BLOOD BY AUTOMATED COUNT: 13.2 %
HCT VFR BLD AUTO: 31.4 %
HGB BLD-MCNC: 10.8 G/DL
IMM GRANULOCYTES # BLD AUTO: 0.07 X10(3) UL (ref 0–1)
IMM GRANULOCYTES NFR BLD: 0.6 %
LYMPHOCYTES # BLD AUTO: 3.12 X10(3) UL (ref 1–4)
LYMPHOCYTES NFR BLD AUTO: 24.5 %
MCH RBC QN AUTO: 32.5 PG (ref 26–34)
MCHC RBC AUTO-ENTMCNC: 34.4 G/DL (ref 31–37)
MCV RBC AUTO: 94.6 FL
MONOCYTES # BLD AUTO: 0.92 X10(3) UL (ref 0.1–1)
MONOCYTES NFR BLD AUTO: 7.2 %
NEUTROPHILS # BLD AUTO: 8.35 X10 (3) UL (ref 1.5–7.7)
NEUTROPHILS # BLD AUTO: 8.35 X10(3) UL (ref 1.5–7.7)
NEUTROPHILS NFR BLD AUTO: 65.7 %
PLATELET # BLD AUTO: 223 10(3)UL (ref 150–450)
RBC # BLD AUTO: 3.32 X10(6)UL
WBC # BLD AUTO: 12.7 X10(3) UL (ref 4–11)

## 2023-02-12 NOTE — PLAN OF CARE
Problem: Patient/Family Goals  Goal: Patient/Family Long Term Goal  Description: Patient's Long Term Goal: patient has uncomplicated delivery    Interventions:  - communicate POC  - See additional Care Plan goals for specific interventions  Outcome: Progressing  Goal: Patient/Family Short Term Goal  Description: Patient's Short Term Goal: patient will maintain afebrile    Interventions:   - communicate POC  - See additional Care Plan goals for specific interventions  Outcome: Progressing     Problem: POSTPARTUM  Goal: Long Term Goal:Experiences normal postpartum course  Description: INTERVENTIONS:  - Assess and monitor vital signs and lab values. - Assess fundus and lochia. - Provide ice/sitz baths for perineum discomfort. - Monitor healing of incision/episiotomy/laceration, and assess for signs and symptoms of infection and hematoma. - Assess bladder function and monitor for bladder distention.  - Provide/instruct/assist with pericare as needed. - Provide VTE prophylaxis as needed. - Monitor bowel function.  - Encourage ambulation and provide assistance as needed. - Assess and monitor emotional status and provide social service/psych resources as needed. - Utilize standard precautions and use personal protective equipment as indicated. Ensure aseptic care of all intravenous lines and invasive tubes/drains.  - Obtain immunization and exposure to communicable diseases history. Outcome: Progressing  Goal: Optimize infant feeding at the breast  Description: INTERVENTIONS:  - Initiate breast feeding within first hour after birth. - Monitor effectiveness of current breast feeding efforts. - Assess support systems available to mother/family.  - Identify cultural beliefs/practices regarding lactation, letdown techniques, maternal food preferences.   - Assess mother's knowledge and previous experience with breast feeding.  - Provide information as needed about early infant feeding cues (e.g., rooting, lip smacking, sucking fingers/hand) versus late cue of crying.  - Discuss/demonstrate breast feeding aids (e.g., infant sling, nursing footstool/pillows, and breast pumps). - Encourage mother/other family members to express feelings/concerns, and actively listen. - Educate father/SO about benefits of breast feeding and how to manage common lactation challenges. - Recommend avoidance of specific medications or substances incompatible with breast feeding.  - Assess and monitor for signs of nipple pain/trauma. - Instruct and provide assistance with proper latch. - Review techniques for milk expression (breast pumping) and storage of breast milk. Provide pumping equipment/supplies, instructions and assistance, as needed. - Encourage rooming-in and breast feeding on demand.  - Encourage skin-to-skin contact. - Provide LC support as needed. - Assess for and manage engorgement. - Provide breast feeding education handouts and information on community breast feeding support. Outcome: Progressing  Goal: Establishment of adequate milk supply with medication/procedure interruptions  Description: INTERVENTIONS:  - Review techniques for milk expression (breast pumping). - Provide pumping equipment/supplies, instructions, and assistance until it is safe to breastfeed infant. Outcome: Progressing  Goal: Appropriate maternal -  bonding  Description: INTERVENTIONS:  - Assess caregiver- interactions. - Assess caregiver's emotional status and coping mechanisms. - Encourage caregiver to participate in  daily care. - Assess support systems available to mother/family.  - Provide /case management support as needed.   Outcome: Progressing

## 2023-02-13 VITALS
HEIGHT: 62 IN | SYSTOLIC BLOOD PRESSURE: 128 MMHG | WEIGHT: 153 LBS | BODY MASS INDEX: 28.16 KG/M2 | DIASTOLIC BLOOD PRESSURE: 78 MMHG | RESPIRATION RATE: 18 BRPM | HEART RATE: 77 BPM | OXYGEN SATURATION: 100 % | TEMPERATURE: 98 F

## 2023-02-13 PROBLEM — O99.013 ANEMIA AFFECTING PREGNANCY IN THIRD TRIMESTER (HCC): Status: RESOLVED | Noted: 2023-01-31 | Resolved: 2023-02-13

## 2023-02-13 PROBLEM — O10.919 CHRONIC HYPERTENSION AFFECTING PREGNANCY (HCC): Status: RESOLVED | Noted: 2022-09-20 | Resolved: 2023-02-13

## 2023-02-13 PROBLEM — O30.049 DICHORIONIC DIAMNIOTIC TWIN PREGNANCY (HCC): Status: RESOLVED | Noted: 2022-09-21 | Resolved: 2023-02-13

## 2023-02-13 PROBLEM — O30.049 DICHORIONIC DIAMNIOTIC TWIN PREGNANCY: Status: RESOLVED | Noted: 2022-09-21 | Resolved: 2023-02-13

## 2023-02-13 PROBLEM — O10.919 CHRONIC HYPERTENSION AFFECTING PREGNANCY: Status: RESOLVED | Noted: 2022-09-20 | Resolved: 2023-02-13

## 2023-02-13 PROBLEM — Z34.90 PREGNANCY (HCC): Status: RESOLVED | Noted: 2023-01-30 | Resolved: 2023-02-13

## 2023-02-13 PROBLEM — Z34.90 PREGNANCY: Status: RESOLVED | Noted: 2023-01-30 | Resolved: 2023-02-13

## 2023-02-13 PROBLEM — O99.013 ANEMIA AFFECTING PREGNANCY IN THIRD TRIMESTER: Status: RESOLVED | Noted: 2023-01-31 | Resolved: 2023-02-13

## 2023-02-13 RX ORDER — IBUPROFEN 600 MG/1
600 TABLET ORAL EVERY 6 HOURS PRN
Qty: 45 TABLET | Refills: 1 | Status: SHIPPED | OUTPATIENT
Start: 2023-02-13

## 2023-02-13 RX ORDER — PSEUDOEPHEDRINE HCL 30 MG
100 TABLET ORAL 2 TIMES DAILY PRN
Qty: 60 CAPSULE | Refills: 1 | Status: SHIPPED | OUTPATIENT
Start: 2023-02-13

## 2023-02-13 NOTE — CM/SW NOTE
met with patient Toni Munoz) to review insurance and PCP for infant. Kavin Hanson stated that she had just [facility] to do med[facility] add on for infant twins in the NICU.  stated that she should get twin medicaid cards in 6- 10 business days in the mail. PCP for twins will be doctor Theo Harris with [facility]. Kavin Hanson is planning on breast feeding when twins are able. Patient has breast pump. Parents are working on getting car seats and cribs when infants are a little older.  answered parents questions at this time.  asked that parents bring twins medicaid cards in when they receive them, so they can be scanned into chart.

## 2023-02-13 NOTE — DISCHARGE SUMMARY
[facility]    Discharge Summary    Levar Bishop Patient Status:  Inpatient    3/6/2000 MRN [id number]   [facility] 2SW-J Attending Nenita Low MD   [facility] Day # 15 PCP Philly Cortez MD     Admit Date: 2023    Discharge Date : 23      Attending Physician: Nenita Low MD    Reason for Admission:   premature rupture of membranes, dichorionic diamniotic twin pregnancy    Procedures:  vaginal, spontaneous    Hospital Course: 20year old  admitted as maternal transport from [facility] at 29w1d with PPROM. Pregnancy significant for di/di twin gestation. Patient without  labor on admission, she received 24 hours Magnesium infusion and completed course of betamethasone, latency antibiotics. Both twins consistently vertex presentation. On 23 at 30w6d pt developed regular painful contractions and progressed in  labor. Administered Mg for neuroprotection. Labor progressed to uncomplicated  x2: twin A, 3lb 3oz male infant with APGARs 7/9; twin B 3lb 8oz male infant with APGARs 7/9. Infants received by NICU teams, transferred to NICU. Maternal postpartum course uncomplicated    Discharge Diagnoses: s/p  of twin pregnancy,  delivery, PPROM    Discharged Condition: good    Disposition: home    Patient Instructions: Follow-up appointment with primary OBGYN in 6 weeks.     Adry Swenson MD  2023  8:54 AM

## 2023-02-13 NOTE — CM/SW NOTE
02/13/23 1000   Referral Data   Referral Source Self referral   Referral Reason Counseling/support; Discharge planning;Psychosocial assessment     SW met with pt to provide resources and offer support due to the NICU admission of her twin boys. Pt known to SW due to her antepartum admission. Pt presented with a cheerful affect and stated she is doing well. Pt resides in [facility] [facility] with her significant other and 3 y/o dtr. Pt reported a strong support system including significant other, family, and friends. Pt reported she has maternity leave. Discussed NICU resources. Pt denied hx of anxiety/depression but stated she had the 'baby blues' with her first child, stating she worried about her dtr if she was not with her. Pt denied hx of Postpartum anxiety/depression. Pt stated she plans on utilizing the [facility] sleep room on the weekends and stated she has someone to watch her dtr. SW informed pt [facility] does not accept reservations and encouraged pt's mother to call SW on desired check-in day regarding bed availability, pt verbalized understanding. SW contact number provided to pt. Pt denied any further needs at this time. YSABEL provided parent NICU packet of resources for [facility] family room and sleep room area, Antepartum/NICU [employer], support groups, and written signs and symptoms of Postpartum Depression/Anxiety with [facility] resources for psychiatrist and counselors.      AURELIA Wright  Discharge Planner

## 2023-02-13 NOTE — DISCHARGE INSTRUCTIONS
For pain: take tylenol 1000 mg every 6 hours as needed, ibuprofen 600 mg every 6 hours as needed    For constipation: take colace (docusate) twice daily as needed. Can also take Miralax or Milk of Magnesia nightly as needed (over the counter)    Nothing in the vagina for 6 weeks. Call office for fever 100.4 or higher, increased vaginal bleeding soaking greater than 1 pad per hour, increased abdominal/pelvic pain, shortness of breath, chest pain, leg pain or swelling, persistent or severe headache, vision changes, persistent or severe upper abdominal pain, feeling depressed or extremely anxious, thoughts of self harm or harming infant(s). Call office to schedule postpartum visit in 6 weeks. Please call with other questions or concerns.

## 2023-02-15 ENCOUNTER — TELEPHONE (OUTPATIENT)
Dept: OBGYN UNIT | Facility: HOSPITAL | Age: 23
End: 2023-02-15

## 2023-02-15 NOTE — PROGRESS NOTES
Reviewed self care w / mom, she verbalizes understanding of instructions reviewed. Encourage to follow up w/ MDs as directed and w/ questions/concerns. Twin boys remain in NICU, mom pumping but hasnt visited yet, lives w her mom for help and support. States bp was fine when she left  but sx of PIH reviewed.

## 2023-02-28 NOTE — ANESTHESIA PROCEDURE NOTES
Follow-up (Left shoulder injection )      HISTORY OF PRESENT ILLNESS:    Alice reports that overall she is not improved. She is here for a left shoulder cortisone injection. She received a right shoulder cortisone injection on 2/14/23 and reports the shoulder is feeling better.         Vitals:    02/28/23 1227   Temp: 95.7 °F (35.4 °C)   TempSrc: Temporal   Weight: 75.8 kg (167 lb)   Height: 5' 3\" (1.6 m)         PHYSICAL EXAM:  SHOULDER:  Left  GENERAL: Well developed, alert and orientated, no acute distress.  INSPECTION:  No swelling, no redness, on exposed areas  VASCULAR: skin warm and dry        ASSESSMENT:  1. Arthritis of left shoulder region        PLAN / Medical Decision Making:  Orders Placed This Encounter   • DRAIN OR INJECT LARGE JOINT OR BURSA WOUT ULTRASOUND GUIDE   • methylPREDNISolone ACETATE long-acting DEPOT (DEPO-Medrol) 40 MG/ML injection 40 mg       Alice follows up today for a left shoulder injection, the right shoulder was injected a few weeks ago which is already starting to take effect.  She wanted to separate them in case she had a flare-up.  The left shoulder injection was placed without difficulty today she will follow up on an as-needed basis.    PROCEDURE:  Shoulder intra-articular injection:  After my usual and customary discussion outlining the risks, benefits, and alternatives for shoulder injection, including but not limited to:  infection, bleeding, damage to underlying structures, steroid flair, increased blood sugar in diabetics, no relief from treatment, the patient consented to a left shoulder injection.  The area to be injected was cleansed. Then using sterile technique, 40 mg depomedrol , 4 ml 1% lidocaine without epi, were injected into the intra-articular space. A sterile bandage was applied. The patient tolerated the procedure well no adverse side effects were noted.      Return if symptoms worsen or fail to improve.    I Raquel Pettit DO, personally obtained the  Labor Analgesia    Date/Time: 2/11/2023 9:19 AM  Performed by: Navid Dupree MD  Authorized by: Navid Dupree MD       General Information and Staff    Start Time:  2/11/2023 9:19 AM  End Time:  2/11/2023 9:29 AM  Anesthesiologist:  Nvaid Dupree MD  Performed by:   Anesthesiologist  Patient Location:  OB  Site Identification: surface landmarks    Reason for Block: labor epidural    Preanesthetic Checklist: patient identified, IV checked, risks and benefits discussed, monitors and equipment checked, pre-op evaluation, timeout performed, IV bolus, anesthesia consent and sterile technique used      Procedure Details    Patient Position:  Sitting  Prep: ChloraPrep    Monitoring:  Heart rate and continuous pulse ox  Approach:  Midline    Epidural Needle    Injection Technique:  RUMA air  Needle Type:  Tuohy  Needle Gauge:  17 G  Needle Length:  3.375 in  Needle Insertion Depth:  4  Location:  L3-4    Spinal Needle      Catheter    Catheter Type:  End hole  Catheter Size:  19 G  Catheter at Skin Depth:  10  Test Dose:  Negative    Assessment    Sensory Level:  T8    Additional Comments history, performed the physical examination, and formulated the plan in this patient visit.

## 2023-03-27 ENCOUNTER — POSTPARTUM (OUTPATIENT)
Dept: OBGYN CLINIC | Facility: CLINIC | Age: 23
End: 2023-03-27

## 2023-03-27 VITALS
BODY MASS INDEX: 25 KG/M2 | SYSTOLIC BLOOD PRESSURE: 134 MMHG | DIASTOLIC BLOOD PRESSURE: 82 MMHG | HEART RATE: 92 BPM | WEIGHT: 136.25 LBS

## 2023-03-27 PROBLEM — O42.919 PRETERM PREMATURE RUPTURE OF MEMBRANES (PPROM) WITH UNKNOWN ONSET OF LABOR: Status: RESOLVED | Noted: 2023-01-30 | Resolved: 2023-03-27

## 2023-03-27 PROBLEM — O42.919 PRETERM PREMATURE RUPTURE OF MEMBRANES (PPROM) WITH UNKNOWN ONSET OF LABOR (HCC): Status: RESOLVED | Noted: 2023-01-30 | Resolved: 2023-03-27

## 2023-03-27 RX ORDER — NORETHINDRONE AND ETHINYL ESTRADIOL 1 MG-35MCG
1 KIT ORAL DAILY
Qty: 84 TABLET | Refills: 4 | Status: SHIPPED | OUTPATIENT
Start: 2023-03-27 | End: 2024-03-26

## 2023-06-02 ENCOUNTER — TELEPHONE (OUTPATIENT)
Dept: OBGYN CLINIC | Facility: CLINIC | Age: 23
End: 2023-06-02

## 2024-05-30 ENCOUNTER — OFFICE VISIT (OUTPATIENT)
Dept: OBGYN CLINIC | Facility: CLINIC | Age: 24
End: 2024-05-30

## 2024-05-30 VITALS
BODY MASS INDEX: 26 KG/M2 | HEART RATE: 91 BPM | SYSTOLIC BLOOD PRESSURE: 109 MMHG | WEIGHT: 143.19 LBS | DIASTOLIC BLOOD PRESSURE: 64 MMHG

## 2024-05-30 DIAGNOSIS — Z01.419 WELL WOMAN EXAM WITH ROUTINE GYNECOLOGICAL EXAM: Primary | ICD-10-CM

## 2024-05-30 PROCEDURE — 99395 PREV VISIT EST AGE 18-39: CPT | Performed by: OBSTETRICS & GYNECOLOGY

## 2024-05-30 RX ORDER — NORETHINDRONE AND ETHINYL ESTRADIOL 1 MG-35MCG
1 KIT ORAL DAILY
Qty: 84 TABLET | Refills: 4 | Status: SHIPPED | OUTPATIENT
Start: 2024-05-30 | End: 2025-05-30

## 2024-05-30 NOTE — PROGRESS NOTES
Monisha Knox is a 24 year old female  Patient's last menstrual period was 2024 (exact date).   Chief Complaint   Patient presents with    Gyn Exam     ANNUAL EXAM     Medication Request     OCP   Presenting for well woman exam. Last pap smear was normal 2020. Monthly menses with normal flow on Oral contraceptive.     OBSTETRICS HISTORY:  OB History    Para Term  AB Living   2 2 1 1 0 3   SAB IAB Ectopic Multiple Live Births   0 0 0 1 3   Obstetric Comments   2019 - transfer of care at 18 wk. Teen pregnancy. Chlamydia during pregnancy. IOL at 37 wk for gestational hypertension. /107 in office. Peripartum fever.       Current -  Di-Di twins. Transfer from [facility] to [facility] on 23 at 29w1d due to PPROM. Betamethasone, latency antibiotics. Probable CHTN - multiple BP meeting criteria, though somewhat labile /89 at 10w2d on , /79 at 18w5d on , /72 at 20w1d on    -       GYNE HISTORY:  Patient's last menstrual period was 2024 (exact date).    History   Sexual Activity    Sexual activity: Yes    Partners: Male        Pap Date: 20  Pap Result Notes: NEG PAP      MEDICAL HISTORY:  Past Medical History:    Chlamydia    Chlamydia    during pregnancy    Gestational hypertension (HCC)    induced at 37 weeks    Hypertension affecting pregnancy in first trimester (HCC)    /89 at 10 wk, 141/79 at 18 wk, 140/72 at 20 wk. Consistent with chronic hypertension     premature rupture of membranes (PPROM) with unknown onset of labor (HCC)         SURGICAL HISTORY:  History reviewed. No pertinent surgical history.    SOCIAL HISTORY:  Social History     Socioeconomic History    Marital status: Single     Spouse name: Not on file    Number of children: Not on file    Years of education: Not on file    Highest education level: Not on file   Occupational History    Not on file   Tobacco Use    Smoking status: Never     Smokeless tobacco: Never   Vaping Use    Vaping status: Never Used   Substance and Sexual Activity    Alcohol use: Not Currently     Comment: rare prior to pregnancy    Drug use: Not Currently    Sexual activity: Yes     Partners: Male   Other Topics Concern    Not on file   Social History Narrative    Not on file     Social Determinants of Health     Financial Resource Strain: Not on file   Food Insecurity: Not on file   Transportation Needs: Not on file   Physical Activity: Not on file   Stress: Not on file   Social Connections: Not on file   Housing Stability: Not on file         Depression Screening (PHQ-2/PHQ-9): Over the LAST 2 WEEKS   Little interest or pleasure in doing things (over the last two weeks)?: Not at all    Feeling down, depressed, or hopeless (over the last two weeks)?: Not at all    PHQ-2 SCORE: 0           MEDICATIONS:    Current Outpatient Medications:     Norethindrone-Eth Estradiol (NORTREL 1/35, 28,) 1-35 MG-MCG Oral Tab, Take 1 tablet by mouth daily., Disp: 84 tablet, Rfl: 4    ibuprofen 600 MG Oral Tab, Take 1 tablet (600 mg total) by mouth every 6 (six) hours as needed for Pain. (Patient not taking: Reported on 3/27/2023), Disp: 45 tablet, Rfl: 1    docusate sodium 100 MG Oral Cap, Take 100 mg by mouth 2 (two) times daily as needed for constipation. (Patient not taking: Reported on 3/27/2023), Disp: 60 capsule, Rfl: 1    Acetaminophen 500 MG Oral Cap, Take 2 capsules (1,000 mg total) by mouth every 6 (six) hours as needed (pain not improved with ibuprofen). (Patient not taking: Reported on 3/27/2023), Disp: 60 capsule, Rfl: 0    prenatal vitamin with DHA 27-0.8-228 MG Oral Cap, Take 1 capsule by mouth daily., Disp: , Rfl:     ALLERGIES:  No Known Allergies      Review of Systems:  Review of Systems   All other systems reviewed and are negative.       Vitals:    05/30/24 1614   BP: 109/64   Pulse: 91       PHYSICAL EXAM:   Physical Exam  Vitals reviewed.   Constitutional:        Appearance: Normal appearance.   HENT:      Head: Atraumatic.   Eyes:      Pupils: Pupils are equal, round, and reactive to light.   Pulmonary:      Effort: Pulmonary effort is normal.   Chest:   Breasts:     Right: Normal. No bleeding, inverted nipple, mass, nipple discharge, skin change or tenderness.      Left: Normal. No bleeding, inverted nipple, mass, nipple discharge, skin change or tenderness.   Abdominal:      General: Abdomen is flat.      Palpations: Abdomen is soft.      Tenderness: There is no abdominal tenderness.   Genitourinary:     General: Normal vulva.      Exam position: Lithotomy position.      Labia:         Right: No rash, tenderness, lesion or injury.         Left: No rash, tenderness, lesion or injury.       Vagina: Normal.      Cervix: Normal.      Uterus: Normal. Not tender.       Adnexa: Right adnexa normal and left adnexa normal.        Right: No tenderness or fullness.          Left: No tenderness or fullness.     Lymphadenopathy:      Upper Body:      Right upper body: No supraclavicular, axillary or pectoral adenopathy.      Left upper body: No supraclavicular, axillary or pectoral adenopathy.   Skin:     General: Skin is warm and dry.   Neurological:      General: No focal deficit present.      Mental Status: She is alert and oriented to person, place, and time.   Psychiatric:         Mood and Affect: Mood normal.         Behavior: Behavior normal.         Thought Content: Thought content normal.         Judgment: Judgment normal.             Assessment & Plan:  Monisha was seen today for gyn exam and medication request.    Diagnoses and all orders for this visit:    Well woman exam with routine gynecological exam  -     ThinPrep PAP with HPV Reflex Request B; Future  -     ThinPrep PAP with HPV Reflex Request B  -     ThinPrep PAP with HPV Reflex Request    Other orders  -     Norethindrone-Eth Estradiol (NORTREL 1/35, 28,) 1-35 MG-MCG Oral Tab; Take 1 tablet by mouth  daily.        Requested Prescriptions     Signed Prescriptions Disp Refills    Norethindrone-Eth Estradiol (NORTREL 1/35, 28,) 1-35 MG-MCG Oral Tab 84 tablet 4     Sig: Take 1 tablet by mouth daily.       Pap smear collected. Oral contraceptive refill sent. Encourage SBE. Recommend exams yearly.

## 2024-06-18 NOTE — ED NOTES
Patient given discharge instructions and all questions answered. Patient is dressed and ambulatory with steady gait to exit.
Pt is 16w. Pregnant presents with a c/o intermittent vaginal spotting since Friday. Pt c/o sharp abd. Pain in her right abdomen.
Pt refusing valenzuela catheter. md made aware.  Water provided to patient and explained risk of drinking water with u/s and pt expressed understanding
negative - no fever

## 2025-02-26 ENCOUNTER — HOSPITAL ENCOUNTER (EMERGENCY)
Facility: HOSPITAL | Age: 25
Discharge: HOME | End: 2025-02-26
Attending: EMERGENCY MEDICINE
Payer: MEDICAID

## 2025-02-26 VITALS
TEMPERATURE: 100 F | DIASTOLIC BLOOD PRESSURE: 85 MMHG | HEIGHT: 62 IN | WEIGHT: 145 LBS | SYSTOLIC BLOOD PRESSURE: 141 MMHG | RESPIRATION RATE: 20 BRPM | OXYGEN SATURATION: 98 % | HEART RATE: 104 BPM | BODY MASS INDEX: 26.68 KG/M2

## 2025-02-26 DIAGNOSIS — J02.0 STREPTOCOCCAL SORE THROAT: Primary | ICD-10-CM

## 2025-02-26 PROCEDURE — 99283 EMERGENCY DEPT VISIT LOW MDM: CPT

## 2025-02-26 PROCEDURE — 87430 STREP A AG IA: CPT | Performed by: EMERGENCY MEDICINE

## 2025-02-26 PROCEDURE — 87430 STREP A AG IA: CPT

## 2025-02-26 RX ORDER — FLUCONAZOLE 150 MG/1
150 TABLET ORAL ONCE
Qty: 1 TABLET | Refills: 0 | Status: SHIPPED | OUTPATIENT
Start: 2025-02-26 | End: 2025-02-26

## 2025-02-26 RX ORDER — AMOXICILLIN 500 MG/1
500 TABLET, FILM COATED ORAL 2 TIMES DAILY
Qty: 20 TABLET | Refills: 0 | Status: SHIPPED | OUTPATIENT
Start: 2025-02-26 | End: 2025-03-08

## 2025-02-26 NOTE — ED PROVIDER NOTES
Patient Seen in: [facility] Emergency Department    History     Chief Complaint   Patient presents with    Sore Throat       HPI    24-year-old female presents ER with complaint of sore throat for the past day.  Patient states that her child's father also diagnosed with strep.  Patient denies any fevers or chills but states she has pain with swallowing.    History reviewed.   Past Medical History:    Chlamydia    Chlamydia    during pregnancy    Gestational hypertension (HCC)    induced at 37 weeks    Hypertension affecting pregnancy in first trimester (HCC)    /89 at 10 wk, 141/79 at 18 wk, 140/72 at 20 wk. Consistent with chronic hypertension     premature rupture of membranes (PPROM) with unknown onset of labor (HCC)       History reviewed. History reviewed. No pertinent surgical history.      Medications :  Prescriptions Prior to Admission[1]     Family History   Problem Relation Age of Onset    Birth Defects Neg        Smoking Status:   Social History     Socioeconomic History    Marital status: Single   Tobacco Use    Smoking status: Never    Smokeless tobacco: Never   Vaping Use    Vaping status: Never Used   Substance and Sexual Activity    Alcohol use: Not Currently     Comment: rare prior to pregnancy    Drug use: Not Currently    Sexual activity: Yes     Partners: Male       ROS  All pertinent positives for the review of systems are mentioned in the HPI  All other organ systems are reviewed and are negative.    Constitutional and vital signs reviewed.      Social History and Family History elements reviewed from today, pertinent positives to the presenting problem noted.    Physical Exam     ED Triage Vitals [25 1620]   /85   Pulse 104   Resp 20   Temp 99.8 °F (37.7 °C)   Temp src Oral   SpO2 98 %   O2 Device None (Room air)       All measures to prevent infection transmission during my interaction with the patient were taken. The patient was already wearing a droplet mask  on my arrival to the room. Personal protective equipment including droplet mask, eye protection, and gloves were worn throughout the duration of the exam.  Handwashing was performed prior to and after the exam.  Stethoscope and any equipment used during my examination was cleaned with super sani-cloth germicidal wipes following the exam.     Physical Exam  Vitals and nursing note reviewed.   Constitutional:       Appearance: She is well-developed.   HENT:      Head: Normocephalic and atraumatic.      Right Ear: External ear normal.      Left Ear: External ear normal.      Nose: Nose normal.      Mouth/Throat:      Mouth: No oral lesions.      Pharynx: Pharyngeal swelling and posterior oropharyngeal erythema present. No oropharyngeal exudate.   Eyes:      Conjunctiva/sclera: Conjunctivae normal.      Pupils: Pupils are equal, round, and reactive to light.   Cardiovascular:      Rate and Rhythm: Normal rate and regular rhythm.      Heart sounds: Normal heart sounds.   Pulmonary:      Effort: Pulmonary effort is normal.      Breath sounds: Normal breath sounds.   Abdominal:      General: Bowel sounds are normal.      Palpations: Abdomen is soft.   Musculoskeletal:         General: Normal range of motion.      Cervical back: Normal range of motion and neck supple.   Skin:     General: Skin is warm and dry.   Neurological:      Mental Status: She is alert and oriented to person, place, and time.      Deep Tendon Reflexes: Reflexes are normal and symmetric.   Psychiatric:         Behavior: Behavior normal.         Thought Content: Thought content normal.         Judgment: Judgment normal.         ED Course        Labs Reviewed   RAPID STREP A SCREEN (LC) - Abnormal; Notable for the following components:       Result Value    Beta Strep Grp A Screen Positive for Beta Streptococcus, Group A (*)     All other components within normal limits    Narrative:     Susceptibility not performed. Beta Streptococcus Group A remains  universally susceptible to penicillin.         Imaging Results Available and Reviewed while in ED: No results found.  ED Medications Administered: Medications - No data to display      MDM     Vitals:    02/26/25 1620   BP: 141/85   Pulse: 104   Resp: 20   Temp: 99.8 °F (37.7 °C)   TempSrc: Oral   SpO2: 98%   Weight: 65.8 kg   Height: 157.5 cm (5' 2\")     *I personally reviewed and interpreted all ED vitals.  I also personally reviewed all labs and imaging if ordered    Pulse Ox: 98%, Room air, Normal     Monitor Interpretation:   normal sinus rhythm    Differential Diagnosis/ Diagnostic Considerations: Strep pharyngitis, viral syndrome, URI,    Medical Record Review: I personally reviewed available prior medical records for any recent pertinent discharge summaries, testing, and procedures and reviewed those reports.    Complicating Factors: The patient already has does not have any pertinent problems on file. to contribute to the complexity of this ED evaluation.    Medical Decision Making  24-year-old female presents ER with complaint of redness to the throat and pain when she swallows.  Patient tested positive for strep pharyngitis.  Patient discharged home with amoxicillin for the next 10 days instructed follow-up with PCP if symptoms continue.    Problems Addressed:  Streptococcal sore throat: acute illness or injury    Amount and/or Complexity of Data Reviewed  Labs: ordered. Decision-making details documented in ED Course.    Risk  Prescription drug management.        Condition upon leaving the department: Stable    Disposition and Plan     Clinical Impression:  1. Streptococcal sore throat        Disposition:  Discharge    Follow-up:  [address] [address]  Schedule an appointment as soon as possible for a visit  If symptoms worsen      Medications Prescribed:  Current Discharge Medication List        START taking these medications    Details   amoxicillin  500 MG Oral Tab Take 1 tablet (500 mg total) by mouth 2 (two) times daily for 10 days.  Qty: 20 tablet, Refills: 0                    [1] (Not in a hospital admission)

## 2025-02-26 NOTE — ED INITIAL ASSESSMENT (HPI)
Pt came in for sore throat started this morning.  Per pt her boyfriend was + strep last Monday here.  Pt denies fever.  Pt is A/Ox 4, breathing unlabored.  Denies cough.   Per pt she only needs strep test.

## 2025-07-28 ENCOUNTER — OFFICE VISIT (OUTPATIENT)
Dept: OBGYN CLINIC | Facility: CLINIC | Age: 25
End: 2025-07-28

## 2025-07-28 VITALS
WEIGHT: 151.38 LBS | DIASTOLIC BLOOD PRESSURE: 75 MMHG | SYSTOLIC BLOOD PRESSURE: 114 MMHG | HEART RATE: 85 BPM | BODY MASS INDEX: 28 KG/M2

## 2025-07-28 DIAGNOSIS — Z30.41 ORAL CONTRACEPTIVE PILL SURVEILLANCE: ICD-10-CM

## 2025-07-28 DIAGNOSIS — Z01.419 WELL WOMAN EXAM WITH ROUTINE GYNECOLOGICAL EXAM: Primary | ICD-10-CM

## 2025-07-28 RX ORDER — NORETHINDRONE AND ETHINYL ESTRADIOL 1 MG-35MCG
1 KIT ORAL DAILY
Qty: 84 TABLET | Refills: 4 | Status: SHIPPED | OUTPATIENT
Start: 2025-07-28

## 2025-07-28 NOTE — PROGRESS NOTES
[facility]    Obstetrics and Gynecology    Chief Complaint   Patient presents with    Gyn Exam     ANNUAL EXAM// OCP REFILL       Monisha Knox is a 25 year old female  Patient's last menstrual period was 2025 (exact date). presenting for annual gynecology exam.  History of Present Illness    On ocps. Regular cycles, lasts 3-5 days, no pain or issues with flow. Happy with pill. Desires to continue. No changes since last visit.  No concerns with intercourse. Declines sti tesitng    Pap:2024 NILM   Contraception:ocps    OBSTETRICS HISTORY:  OB History    Para Term  AB Living   2 2 1 1 0 3   SAB IAB Ectopic Multiple Live Births   0 0 0 1 3   Obstetric Comments   2019 - transfer of care at 18 wk. Teen pregnancy. Chlamydia during pregnancy. IOL at 37 wk for gestational hypertension. /107 in office. Peripartum fever.       Current -  Di-Di twins. Transfer from [facility] to [facility] on 23 at 29w1d due to PPROM. Betamethasone, latency antibiotics. Probable CHTN - multiple BP meeting criteria, though somewhat labile /89 at 10w2d on , /79 at 18w5d on , /72 at 20w1d on    -       GYNE HISTORY:  Menarche: 11 (2025  2:59 PM)  Period Cycle (Days): 28 (2025  2:59 PM)  Period Duration (Days): 3-5 DAYS (2025  2:59 PM)  Period Flow: MODERATE (2025  2:59 PM)  Use of Birth Control (if yes, specify type): OCP (2025  2:59 PM)  Date When Birth Control Last Used: 25 (2025  2:59 PM)  Pap Date: 24 (2025  2:59 PM)  Pap Result Notes: NEG PAP (2025  2:59 PM)  Follow Up Recommendation: 24 JMN (2025  2:59 PM)      History   Sexual Activity    Sexual activity: Yes    Partners: Male           Latest Ref Rng & Units 2024     5:13 PM 2020     3:49 PM   RECENT PAP RESULTS   INTERPRETATION/RESULT: Negative for intraepithelial lesion or malignancy Negative for intraepithelial lesion  or malignancy  Negative for intraepithelial lesion or malignancy    HPV Negative  Negative          MEDICAL HISTORY:  Past Medical History:    Chlamydia    Chlamydia    during pregnancy    Gestational hypertension (HCC)    induced at 37 weeks    Hypertension affecting pregnancy in first trimester (HCC)    /89 at 10 wk, 141/79 at 18 wk, 140/72 at 20 wk. Consistent with chronic hypertension     premature rupture of membranes (PPROM) with unknown onset of labor (HCC)     History reviewed. No pertinent surgical history.    SOCIAL HISTORY:  Social History     Socioeconomic History    Marital status: Single     Spouse name: Not on file    Number of children: Not on file    Years of education: Not on file    Highest education level: Not on file   Occupational History    Not on file   Tobacco Use    Smoking status: Never    Smokeless tobacco: Never   Vaping Use    Vaping status: Never Used   Substance and Sexual Activity    Alcohol use: Not Currently     Comment: rare prior to pregnancy    Drug use: Not Currently    Sexual activity: Yes     Partners: Male   Other Topics Concern    Not on file   Social History Narrative    Not on file     Social Drivers of Health     Food Insecurity: Not on file   Transportation Needs: Not on file   Stress: Not on file   Housing Stability: Not on file         Depression Screening (PHQ-2/PHQ-9): Over the LAST 2 WEEKS   Little interest or pleasure in doing things: Not at all    Feeling down, depressed, or hopeless: Not at all    PHQ-2 SCORE: 0           FAMILY HISTORY:  Family History   Problem Relation Age of Onset    Birth Defects Neg        MEDICATIONS:    Current Outpatient Medications:     Norethindrone-Eth Estradiol (NORTREL , 28,) 1-35 MG-MCG Oral Tab, Take 1 tablet by mouth daily., Disp: 84 tablet, Rfl: 4    ALLERGIES:  No Known Allergies      Review of Systems:  Constitutional:  Denies fatigue, night sweats, hot flashes  Eyes:  denies blurred or double  vision  Cardiovascular:  denies chest pain or palpitations  Respiratory:  denies shortness of breath  Gastrointestinal:  denies heartburn, abdominal pain, diarrhea or constipation  Genitourinary:  denies dysuria, incontinence, abnormal vaginal discharge, vaginal itching,   Musculoskeletal:  denies back pain   Skin/Breast:  Denies any breast pain, lumps, or discharge.   Neurological:  denies headaches, extremity weakness or numbness.  Psychiatric: denies depression or anxiety.  Endocrine:   denies excessive thirst or urination.  Heme/Lymph:  denies history of anemia, easy bruising or bleeding.      PHYSICAL EXAM:     Vitals:    07/28/25 1503   BP: 114/75   Pulse: 85   Weight: 151 lb 6.4 oz (68.7 kg)       Body mass index is 27.69 kg/m².     Patient offered chaperone, patient declined    Constitutional: well developed, well nourished  Psychiatric:  Oriented to time, place, person and situation. Appropriate mood and affect      Pelvic Exam:  External Genitalia: normal appearance, hair distribution, and no lesions  Urethral Meatus:  normal in size, location, without lesions and prolapse  Bladder:  No fullness, masses or tenderness  Vagina:  Normal appearance without lesions, no abnormal discharge  Cervix:  Normal without tenderness on motion  Uterus: normal in size, contour, position, mobility, without tenderness  Adnexa: normal without masses or tenderness  Perineum: normal  Anus: no hemorroids     Assessment & Plan:    ICD-10-CM    1. Well woman exam with routine gynecological exam  Z01.419       2. Oral contraceptive pill surveillance  Z30.41 Norethindrone-Eth Estradiol (NORTREL 1/35, 28,) 1-35 MG-MCG Oral Tab         Assessment & Plan  Pap UTD, due in 2027  Self breast exams reviewed  Declined sti testing  Desires to continue ocps.  Reviewed risks and benefits of oral contraceptives. Reviewed to call or go to ER if increased headaches, SOB, CP or leg pain with or without swelling.  Reviewed when to start OCPs, how  to take OCPs, and when to use back up contraception  Patient verbalized understanding  Discussed diet, exercise, MVIs with Ca/Vit D  Follow up in 1 yr for BOBBY Hogan      This note was prepared using Dragon Medical voice recognition dictation software. As a result errors may occur. When identified these errors have been corrected. While every attempt is made to correct errors during dictation discrepancies may still exist.

## (undated) NOTE — LETTER
Date & Time: 4/7/2019, 5:41 AM  Patient: Charli Nunez  Encounter Provider(s):    Nae Jimenez MD       To Whom It May Concern:    Charli Nunez was seen and treated in our department on 4/7/2019. She may return to work 4/8/2019.     If you have any q

## (undated) NOTE — ED AVS SNAPSHOT
Janay Loomis   MRN: [id number]    Department:  [facility] Emergency Department   Date of Visit:  4/7/2019           Disclosure     Insurance plans vary and the physician(s) referred by the ER may not be covered by your plan.  Please contact your CARE PHYSICIAN AT ONCE OR RETURN IMMEDIATELY TO THE EMERGENCY DEPARTMENT. If you have been prescribed any medication(s), please fill your prescription right away and begin taking the medication(s) as directed.   If you believe that any of the medications

## (undated) NOTE — ED AVS SNAPSHOT
Jae Lovelace   MRN: [id number]    Department:  [facility] Emergency Department   Date of Visit:  2/27/2019           Disclosure     Insurance plans vary and the physician(s) referred by the ER may not be covered by your plan.  Please contact you CARE PHYSICIAN AT ONCE OR RETURN IMMEDIATELY TO THE EMERGENCY DEPARTMENT. If you have been prescribed any medication(s), please fill your prescription right away and begin taking the medication(s) as directed.   If you believe that any of the medications

## (undated) NOTE — LETTER
Date & Time: 10/15/2020, 6:31 AM  Patient: Zay Garduno  Encounter Provider(s):    Aries Mar MD       To Whom It May Concern:    Jeff Franks was seen and treated in our department on 10/15/2020. She can return to work on 10/17/2020.     I

## (undated) NOTE — LETTER
Date & Time: 2/27/2025, 3:48 PM  Patient: Monisha Knox  Encounter Provider(s):    Mathew Spear DO       To Whom It May Concern:    Monisha Knox was seen and treated in our department on 2/26/2025. She can return to work 3/3/2025.    If you have any questions or concerns, please do not hesitate to call.        _____________________________  Physician/APC Signature

## (undated) NOTE — LETTER
8/27/2019          To Whom It May Concern:    Kayce Laguerre is currently under my medical care for her pregnancy. She was seen this afternoon for an appointment. Please excuse her from the time missed.   If you require additional information please

## (undated) NOTE — LETTER
1/11/2023          To Whom It May Concern:    Denny Foss is currently under my medical care for her pregnancy. She has a twin pregnancy with an expected due date of 4/16/23. If you require additional information please contact our office.     Sincerely,      Leticia Mcmillan DO          Document generated by:  Leticia Mcmillan DO

## (undated) NOTE — MR AVS SNAPSHOT
After Visit Summary   6/18/2020    Peyton Bishop    MRN: [id number]           Visit Information     Date & Time  6/18/2020  3:00 PM Provider  Blanca Arevalo MD [address] [place] [address], [address] [place] [address],[address], [facility].  Phone Water is best for hydration Fast food. Eat at home when possible     Tips for increasing your physical activity – Adults who are physically active are less likely to develop some chronic diseases than adults who are inactive.      HOW TO GET STARTED: HOW e-VISTS  Average cost  $35*     SAME DAY APPOINTMENTS   Available at primary care offices    AFTER HOURS CARE  [place]  OFFICE VISIT   Primary Care Providers  Treatment for mild illness or injury that does not require immediate attention.  Average cost  $70